# Patient Record
Sex: MALE | Race: WHITE | NOT HISPANIC OR LATINO | URBAN - METROPOLITAN AREA
[De-identification: names, ages, dates, MRNs, and addresses within clinical notes are randomized per-mention and may not be internally consistent; named-entity substitution may affect disease eponyms.]

---

## 2017-03-30 ENCOUNTER — COMMUNICATION - HEALTHEAST (OUTPATIENT)
Dept: FAMILY MEDICINE | Facility: CLINIC | Age: 42
End: 2017-03-30

## 2017-03-30 DIAGNOSIS — E03.9 HYPOTHYROIDISM: ICD-10-CM

## 2017-03-31 ENCOUNTER — COMMUNICATION - HEALTHEAST (OUTPATIENT)
Dept: FAMILY MEDICINE | Facility: CLINIC | Age: 42
End: 2017-03-31

## 2017-04-03 ENCOUNTER — AMBULATORY - HEALTHEAST (OUTPATIENT)
Dept: LAB | Facility: CLINIC | Age: 42
End: 2017-04-03

## 2017-04-03 DIAGNOSIS — E03.9 HYPOTHYROIDISM: ICD-10-CM

## 2017-04-04 ENCOUNTER — AMBULATORY - HEALTHEAST (OUTPATIENT)
Dept: FAMILY MEDICINE | Facility: CLINIC | Age: 42
End: 2017-04-04

## 2017-04-04 DIAGNOSIS — E03.9 ACQUIRED HYPOTHYROIDISM: ICD-10-CM

## 2017-04-05 ENCOUNTER — COMMUNICATION - HEALTHEAST (OUTPATIENT)
Dept: FAMILY MEDICINE | Facility: CLINIC | Age: 42
End: 2017-04-05

## 2017-04-14 ENCOUNTER — RECORDS - HEALTHEAST (OUTPATIENT)
Dept: ADMINISTRATIVE | Facility: OTHER | Age: 42
End: 2017-04-14

## 2017-10-03 ENCOUNTER — COMMUNICATION - HEALTHEAST (OUTPATIENT)
Dept: FAMILY MEDICINE | Facility: CLINIC | Age: 42
End: 2017-10-03

## 2017-10-03 DIAGNOSIS — E03.9 ACQUIRED HYPOTHYROIDISM: ICD-10-CM

## 2017-12-11 ENCOUNTER — OFFICE VISIT - HEALTHEAST (OUTPATIENT)
Dept: FAMILY MEDICINE | Facility: CLINIC | Age: 42
End: 2017-12-11

## 2017-12-11 DIAGNOSIS — E03.9 ACQUIRED HYPOTHYROIDISM: ICD-10-CM

## 2017-12-11 DIAGNOSIS — L80 VITILIGO: ICD-10-CM

## 2017-12-11 DIAGNOSIS — E78.00 HYPERCHOLESTEREMIA: ICD-10-CM

## 2017-12-11 DIAGNOSIS — R07.9 CHEST PAIN, UNSPECIFIED TYPE: ICD-10-CM

## 2017-12-11 DIAGNOSIS — Z13.1 SCREENING FOR DIABETES MELLITUS: ICD-10-CM

## 2017-12-11 NOTE — ASSESSMENT & PLAN NOTE
Family history of heart disease in his father.  Annual cholesterol check.  He will return for fasting labs later this week.

## 2017-12-11 NOTE — ASSESSMENT & PLAN NOTE
The patient has been taking Synthroid as prescribed.  He has bradycardia and weight gain on exam today.  -Check TSH and adjust thyroid replacement dose as needed.

## 2017-12-28 ENCOUNTER — AMBULATORY - HEALTHEAST (OUTPATIENT)
Dept: LAB | Facility: CLINIC | Age: 42
End: 2017-12-28

## 2017-12-28 DIAGNOSIS — K51.00 ULCERATIVE CHRONIC PANCOLITIS WITHOUT COMPLICATIONS (H): ICD-10-CM

## 2017-12-28 DIAGNOSIS — E03.9 ACQUIRED HYPOTHYROIDISM: ICD-10-CM

## 2017-12-28 DIAGNOSIS — E78.00 HYPERCHOLESTEREMIA: ICD-10-CM

## 2017-12-28 DIAGNOSIS — Z13.1 SCREENING FOR DIABETES MELLITUS: ICD-10-CM

## 2017-12-28 LAB
CHOLEST SERPL-MCNC: 261 MG/DL
FASTING STATUS PATIENT QL REPORTED: YES
HDLC SERPL-MCNC: 57 MG/DL
LDLC SERPL CALC-MCNC: 185 MG/DL
TRIGL SERPL-MCNC: 94 MG/DL

## 2017-12-31 ENCOUNTER — COMMUNICATION - HEALTHEAST (OUTPATIENT)
Dept: FAMILY MEDICINE | Facility: CLINIC | Age: 42
End: 2017-12-31

## 2017-12-31 ENCOUNTER — AMBULATORY - HEALTHEAST (OUTPATIENT)
Dept: FAMILY MEDICINE | Facility: CLINIC | Age: 42
End: 2017-12-31

## 2017-12-31 DIAGNOSIS — E03.9 ACQUIRED HYPOTHYROIDISM: ICD-10-CM

## 2018-01-03 ENCOUNTER — COMMUNICATION - HEALTHEAST (OUTPATIENT)
Dept: FAMILY MEDICINE | Facility: CLINIC | Age: 43
End: 2018-01-03

## 2018-01-08 ENCOUNTER — RECORDS - HEALTHEAST (OUTPATIENT)
Dept: ADMINISTRATIVE | Facility: OTHER | Age: 43
End: 2018-01-08

## 2018-02-02 ENCOUNTER — RECORDS - HEALTHEAST (OUTPATIENT)
Dept: ADMINISTRATIVE | Facility: OTHER | Age: 43
End: 2018-02-02

## 2018-03-20 ENCOUNTER — RECORDS - HEALTHEAST (OUTPATIENT)
Dept: ADMINISTRATIVE | Facility: OTHER | Age: 43
End: 2018-03-20

## 2018-03-22 ENCOUNTER — AMBULATORY - HEALTHEAST (OUTPATIENT)
Dept: LAB | Facility: CLINIC | Age: 43
End: 2018-03-22

## 2018-03-22 DIAGNOSIS — E03.9 ACQUIRED HYPOTHYROIDISM: ICD-10-CM

## 2018-03-22 DIAGNOSIS — K51.00 ULCERATIVE CHRONIC PANCOLITIS WITHOUT COMPLICATIONS (H): ICD-10-CM

## 2018-03-22 LAB
BUN SERPL-MCNC: 22 MG/DL (ref 8–22)
CREAT SERPL-MCNC: 1.26 MG/DL (ref 0.7–1.3)
GFR SERPL CREATININE-BSD FRML MDRD: >60 ML/MIN/1.73M2
TSH SERPL DL<=0.005 MIU/L-ACNC: 3.49 UIU/ML (ref 0.3–5)

## 2018-03-23 ENCOUNTER — AMBULATORY - HEALTHEAST (OUTPATIENT)
Dept: FAMILY MEDICINE | Facility: CLINIC | Age: 43
End: 2018-03-23

## 2018-03-23 ENCOUNTER — RECORDS - HEALTHEAST (OUTPATIENT)
Dept: ADMINISTRATIVE | Facility: OTHER | Age: 43
End: 2018-03-23

## 2018-03-23 DIAGNOSIS — E03.9 ACQUIRED HYPOTHYROIDISM: ICD-10-CM

## 2018-04-24 ENCOUNTER — RECORDS - HEALTHEAST (OUTPATIENT)
Dept: ADMINISTRATIVE | Facility: OTHER | Age: 43
End: 2018-04-24

## 2018-05-01 ENCOUNTER — RECORDS - HEALTHEAST (OUTPATIENT)
Dept: ADMINISTRATIVE | Facility: OTHER | Age: 43
End: 2018-05-01

## 2018-06-13 ENCOUNTER — RECORDS - HEALTHEAST (OUTPATIENT)
Dept: ADMINISTRATIVE | Facility: OTHER | Age: 43
End: 2018-06-13

## 2018-06-18 ENCOUNTER — COMMUNICATION - HEALTHEAST (OUTPATIENT)
Dept: FAMILY MEDICINE | Facility: CLINIC | Age: 43
End: 2018-06-18

## 2018-07-06 ENCOUNTER — COMMUNICATION - HEALTHEAST (OUTPATIENT)
Dept: FAMILY MEDICINE | Facility: CLINIC | Age: 43
End: 2018-07-06

## 2018-07-16 ENCOUNTER — COMMUNICATION - HEALTHEAST (OUTPATIENT)
Dept: FAMILY MEDICINE | Facility: CLINIC | Age: 43
End: 2018-07-16

## 2018-07-19 ENCOUNTER — COMMUNICATION - HEALTHEAST (OUTPATIENT)
Dept: FAMILY MEDICINE | Facility: CLINIC | Age: 43
End: 2018-07-19

## 2018-07-19 ENCOUNTER — AMBULATORY - HEALTHEAST (OUTPATIENT)
Dept: LAB | Facility: CLINIC | Age: 43
End: 2018-07-19

## 2018-07-19 DIAGNOSIS — K51.00 ULCERATIVE CHRONIC PANCOLITIS WITHOUT COMPLICATIONS (H): ICD-10-CM

## 2018-07-19 DIAGNOSIS — E03.9 ACQUIRED HYPOTHYROIDISM: ICD-10-CM

## 2018-07-19 LAB
BUN SERPL-MCNC: 19 MG/DL (ref 8–22)
CREAT SERPL-MCNC: 1.22 MG/DL (ref 0.7–1.3)
GFR SERPL CREATININE-BSD FRML MDRD: >60 ML/MIN/1.73M2

## 2018-07-30 ENCOUNTER — COMMUNICATION - HEALTHEAST (OUTPATIENT)
Dept: FAMILY MEDICINE | Facility: CLINIC | Age: 43
End: 2018-07-30

## 2018-07-30 DIAGNOSIS — E03.9 HYPOTHYROIDISM: ICD-10-CM

## 2018-07-31 ENCOUNTER — COMMUNICATION - HEALTHEAST (OUTPATIENT)
Dept: FAMILY MEDICINE | Facility: CLINIC | Age: 43
End: 2018-07-31

## 2018-08-02 ENCOUNTER — AMBULATORY - HEALTHEAST (OUTPATIENT)
Dept: LAB | Facility: CLINIC | Age: 43
End: 2018-08-02

## 2018-08-02 DIAGNOSIS — E03.9 HYPOTHYROIDISM: ICD-10-CM

## 2018-08-02 DIAGNOSIS — E03.9 ACQUIRED HYPOTHYROIDISM: ICD-10-CM

## 2018-08-02 LAB
CHOLEST SERPL-MCNC: 234 MG/DL
FASTING STATUS PATIENT QL REPORTED: YES
HDLC SERPL-MCNC: 57 MG/DL
LDLC SERPL CALC-MCNC: 156 MG/DL
TRIGL SERPL-MCNC: 103 MG/DL
TSH SERPL DL<=0.005 MIU/L-ACNC: 18.08 UIU/ML (ref 0.3–5)

## 2018-08-03 ENCOUNTER — AMBULATORY - HEALTHEAST (OUTPATIENT)
Dept: FAMILY MEDICINE | Facility: CLINIC | Age: 43
End: 2018-08-03

## 2018-08-03 ENCOUNTER — COMMUNICATION - HEALTHEAST (OUTPATIENT)
Dept: FAMILY MEDICINE | Facility: CLINIC | Age: 43
End: 2018-08-03

## 2018-08-03 DIAGNOSIS — E03.9 ACQUIRED HYPOTHYROIDISM: ICD-10-CM

## 2018-09-05 ENCOUNTER — COMMUNICATION - HEALTHEAST (OUTPATIENT)
Dept: FAMILY MEDICINE | Facility: CLINIC | Age: 43
End: 2018-09-05

## 2018-09-28 ASSESSMENT — MIFFLIN-ST. JEOR: SCORE: 1832

## 2018-09-29 ENCOUNTER — SURGERY - HEALTHEAST (OUTPATIENT)
Dept: SURGERY | Facility: CLINIC | Age: 43
End: 2018-09-29

## 2018-09-29 ENCOUNTER — ANESTHESIA - HEALTHEAST (OUTPATIENT)
Dept: SURGERY | Facility: CLINIC | Age: 43
End: 2018-09-29

## 2018-09-29 ASSESSMENT — MIFFLIN-ST. JEOR: SCORE: 1865.74

## 2018-10-02 ENCOUNTER — COMMUNICATION - HEALTHEAST (OUTPATIENT)
Dept: CARE COORDINATION | Facility: CLINIC | Age: 43
End: 2018-10-02

## 2018-10-04 ENCOUNTER — COMMUNICATION - HEALTHEAST (OUTPATIENT)
Dept: FAMILY MEDICINE | Facility: CLINIC | Age: 43
End: 2018-10-04

## 2018-10-04 ENCOUNTER — OFFICE VISIT - HEALTHEAST (OUTPATIENT)
Dept: FAMILY MEDICINE | Facility: CLINIC | Age: 43
End: 2018-10-04

## 2018-10-04 DIAGNOSIS — Z09 HOSPITAL DISCHARGE FOLLOW-UP: ICD-10-CM

## 2018-10-04 DIAGNOSIS — Z90.49 S/P APPENDECTOMY: ICD-10-CM

## 2018-10-04 DIAGNOSIS — E03.9 ACQUIRED HYPOTHYROIDISM: ICD-10-CM

## 2018-10-04 NOTE — ASSESSMENT & PLAN NOTE
Question absorption.  The patient will be taking his Synthroid at approximately 5 AM by setting an alarm.  We will recheck in 6-8 weeks.  If his TSH has not improved, will adjust his levothyroxine dose and refer to endocrinology.

## 2018-11-19 ENCOUNTER — COMMUNICATION - HEALTHEAST (OUTPATIENT)
Dept: FAMILY MEDICINE | Facility: CLINIC | Age: 43
End: 2018-11-19

## 2018-11-27 ENCOUNTER — COMMUNICATION - HEALTHEAST (OUTPATIENT)
Dept: FAMILY MEDICINE | Facility: CLINIC | Age: 43
End: 2018-11-27

## 2018-11-27 DIAGNOSIS — E78.00 HYPERCHOLESTEREMIA: ICD-10-CM

## 2018-11-28 ENCOUNTER — AMBULATORY - HEALTHEAST (OUTPATIENT)
Dept: LAB | Facility: CLINIC | Age: 43
End: 2018-11-28

## 2018-11-28 DIAGNOSIS — E03.9 ACQUIRED HYPOTHYROIDISM: ICD-10-CM

## 2018-11-28 DIAGNOSIS — E78.00 HYPERCHOLESTEREMIA: ICD-10-CM

## 2018-11-28 LAB — TSH SERPL DL<=0.005 MIU/L-ACNC: 1.81 UIU/ML (ref 0.3–5)

## 2018-11-29 ENCOUNTER — COMMUNICATION - HEALTHEAST (OUTPATIENT)
Dept: FAMILY MEDICINE | Facility: CLINIC | Age: 43
End: 2018-11-29

## 2018-11-29 LAB
CHOLEST SERPL-MCNC: 247 MG/DL
FASTING STATUS PATIENT QL REPORTED: YES
HDLC SERPL-MCNC: 59 MG/DL
LDLC SERPL CALC-MCNC: 169 MG/DL
TRIGL SERPL-MCNC: 97 MG/DL

## 2018-12-31 ENCOUNTER — OFFICE VISIT - HEALTHEAST (OUTPATIENT)
Dept: FAMILY MEDICINE | Facility: CLINIC | Age: 43
End: 2018-12-31

## 2018-12-31 ENCOUNTER — RECORDS - HEALTHEAST (OUTPATIENT)
Dept: GENERAL RADIOLOGY | Facility: CLINIC | Age: 43
End: 2018-12-31

## 2018-12-31 DIAGNOSIS — M25.512 ACUTE PAIN OF LEFT SHOULDER: ICD-10-CM

## 2018-12-31 DIAGNOSIS — M25.512 PAIN IN LEFT SHOULDER: ICD-10-CM

## 2018-12-31 ASSESSMENT — MIFFLIN-ST. JEOR: SCORE: 1860.35

## 2018-12-31 NOTE — ASSESSMENT & PLAN NOTE
This patient has a muscular build and works out on a regular basis.  He has mild pain with certain movements (Lemons) which suggest rotator cuff injury/inflammation versus AC joint impingement.  Recommending scheduled anti-inflammatories and physical therapy.  We did discuss the utility of a MRI but given abnormality of shoulder x-ray will defer for now.  Consider orthopedic referral as well.

## 2019-01-14 ENCOUNTER — OFFICE VISIT - HEALTHEAST (OUTPATIENT)
Dept: PHYSICAL THERAPY | Facility: REHABILITATION | Age: 44
End: 2019-01-14

## 2019-01-14 DIAGNOSIS — M25.612 DECREASED RANGE OF MOTION OF LEFT SHOULDER: ICD-10-CM

## 2019-01-14 DIAGNOSIS — M25.512 ACUTE PAIN OF LEFT SHOULDER: ICD-10-CM

## 2019-01-14 DIAGNOSIS — M62.81 GENERALIZED MUSCLE WEAKNESS: ICD-10-CM

## 2019-01-21 ENCOUNTER — COMMUNICATION - HEALTHEAST (OUTPATIENT)
Dept: PHYSICAL THERAPY | Facility: REHABILITATION | Age: 44
End: 2019-01-21

## 2019-01-23 ENCOUNTER — COMMUNICATION - HEALTHEAST (OUTPATIENT)
Dept: FAMILY MEDICINE | Facility: CLINIC | Age: 44
End: 2019-01-23

## 2019-01-23 DIAGNOSIS — E03.9 ACQUIRED HYPOTHYROIDISM: ICD-10-CM

## 2019-02-04 ENCOUNTER — COMMUNICATION - HEALTHEAST (OUTPATIENT)
Dept: PHYSICAL THERAPY | Facility: REHABILITATION | Age: 44
End: 2019-02-04

## 2019-02-18 ENCOUNTER — OFFICE VISIT - HEALTHEAST (OUTPATIENT)
Dept: PHYSICAL THERAPY | Facility: REHABILITATION | Age: 44
End: 2019-02-18

## 2019-02-18 DIAGNOSIS — M25.512 ACUTE PAIN OF LEFT SHOULDER: ICD-10-CM

## 2019-02-18 DIAGNOSIS — M62.81 GENERALIZED MUSCLE WEAKNESS: ICD-10-CM

## 2019-02-18 DIAGNOSIS — M25.612 DECREASED RANGE OF MOTION OF LEFT SHOULDER: ICD-10-CM

## 2019-07-18 ENCOUNTER — RECORDS - HEALTHEAST (OUTPATIENT)
Dept: ADMINISTRATIVE | Facility: OTHER | Age: 44
End: 2019-07-18

## 2019-10-09 ENCOUNTER — COMMUNICATION - HEALTHEAST (OUTPATIENT)
Dept: FAMILY MEDICINE | Facility: CLINIC | Age: 44
End: 2019-10-09

## 2019-10-09 DIAGNOSIS — Z72.0 TOBACCO ABUSE: ICD-10-CM

## 2019-10-11 ENCOUNTER — AMBULATORY - HEALTHEAST (OUTPATIENT)
Dept: FAMILY MEDICINE | Facility: CLINIC | Age: 44
End: 2019-10-11

## 2019-10-11 DIAGNOSIS — Z13.1 SCREENING FOR DIABETES MELLITUS: ICD-10-CM

## 2019-10-11 DIAGNOSIS — Z82.49 FAMILY HISTORY OF EARLY CAD: ICD-10-CM

## 2019-10-11 DIAGNOSIS — E78.00 HYPERCHOLESTEREMIA: ICD-10-CM

## 2019-10-11 DIAGNOSIS — E03.9 ACQUIRED HYPOTHYROIDISM: ICD-10-CM

## 2019-10-14 ENCOUNTER — AMBULATORY - HEALTHEAST (OUTPATIENT)
Dept: LAB | Facility: CLINIC | Age: 44
End: 2019-10-14

## 2019-10-14 DIAGNOSIS — Z82.49 FAMILY HISTORY OF EARLY CAD: ICD-10-CM

## 2019-10-14 DIAGNOSIS — E03.9 ACQUIRED HYPOTHYROIDISM: ICD-10-CM

## 2019-10-14 DIAGNOSIS — E78.00 HYPERCHOLESTEREMIA: ICD-10-CM

## 2019-10-14 DIAGNOSIS — Z13.1 SCREENING FOR DIABETES MELLITUS: ICD-10-CM

## 2019-10-14 LAB
ALT SERPL W P-5'-P-CCNC: 24 U/L (ref 0–45)
ANION GAP SERPL CALCULATED.3IONS-SCNC: 7 MMOL/L (ref 5–18)
BUN SERPL-MCNC: 19 MG/DL (ref 8–22)
CALCIUM SERPL-MCNC: 9.8 MG/DL (ref 8.5–10.5)
CHLORIDE BLD-SCNC: 105 MMOL/L (ref 98–107)
CHOLEST SERPL-MCNC: 246 MG/DL
CO2 SERPL-SCNC: 28 MMOL/L (ref 22–31)
CREAT SERPL-MCNC: 1.09 MG/DL (ref 0.7–1.3)
FASTING STATUS PATIENT QL REPORTED: YES
GFR SERPL CREATININE-BSD FRML MDRD: >60 ML/MIN/1.73M2
GLUCOSE BLD-MCNC: 97 MG/DL (ref 70–125)
HDLC SERPL-MCNC: 70 MG/DL
LDLC SERPL CALC-MCNC: 165 MG/DL
POTASSIUM BLD-SCNC: 4.7 MMOL/L (ref 3.5–5)
SODIUM SERPL-SCNC: 140 MMOL/L (ref 136–145)
TRIGL SERPL-MCNC: 56 MG/DL
TSH SERPL DL<=0.005 MIU/L-ACNC: 7.11 UIU/ML (ref 0.3–5)

## 2019-10-30 ENCOUNTER — RECORDS - HEALTHEAST (OUTPATIENT)
Dept: ADMINISTRATIVE | Facility: OTHER | Age: 44
End: 2019-10-30

## 2019-11-05 ENCOUNTER — COMMUNICATION - HEALTHEAST (OUTPATIENT)
Dept: FAMILY MEDICINE | Facility: CLINIC | Age: 44
End: 2019-11-05

## 2019-11-05 DIAGNOSIS — L80 VITILIGO: ICD-10-CM

## 2019-11-05 RX ORDER — CLOBETASOL PROPIONATE 0.5 MG/G
OINTMENT TOPICAL 2 TIMES DAILY
Qty: 45 G | Refills: 1 | Status: SHIPPED | OUTPATIENT
Start: 2019-11-05 | End: 2023-08-03

## 2019-11-25 ENCOUNTER — OFFICE VISIT - HEALTHEAST (OUTPATIENT)
Dept: FAMILY MEDICINE | Facility: CLINIC | Age: 44
End: 2019-11-25

## 2019-11-25 ENCOUNTER — COMMUNICATION - HEALTHEAST (OUTPATIENT)
Dept: FAMILY MEDICINE | Facility: CLINIC | Age: 44
End: 2019-11-25

## 2019-11-25 DIAGNOSIS — E03.9 ACQUIRED HYPOTHYROIDISM: ICD-10-CM

## 2019-11-25 DIAGNOSIS — Z00.00 ROUTINE GENERAL MEDICAL EXAMINATION AT A HEALTH CARE FACILITY: ICD-10-CM

## 2019-11-25 DIAGNOSIS — Z72.0 TOBACCO ABUSE: ICD-10-CM

## 2019-11-25 DIAGNOSIS — E78.00 HYPERCHOLESTEREMIA: ICD-10-CM

## 2019-11-25 DIAGNOSIS — Z82.49 FAMILY HISTORY OF EARLY CAD: ICD-10-CM

## 2019-11-25 LAB — TSH SERPL DL<=0.005 MIU/L-ACNC: 0.05 UIU/ML (ref 0.3–5)

## 2019-11-25 RX ORDER — MESALAMINE 1.2 G/1
TABLET, DELAYED RELEASE ORAL
Status: SHIPPED | COMMUNITY
Start: 2019-11-25

## 2019-11-25 ASSESSMENT — MIFFLIN-ST. JEOR: SCORE: 1870.56

## 2019-11-25 NOTE — ASSESSMENT & PLAN NOTE
The patient and I discussed the most recent TSH measurement.  This was drawn approximately 1 week after he completed a month-long course of steroids for an ulcerative colitis flare.  We will plan to recheck his TSH today before adjusting his levothyroxine.

## 2019-11-25 NOTE — ASSESSMENT & PLAN NOTE
Patient presents for annual exam.  Fasting lab work was completed in October.  This was reviewed with the patient today.  We will repeat a TSH today as his previous may have been erroneous as result of Synthroid administration and treatment for ulcerative colitis.  The patient is up-to-date with preventative health recommendations.  He has a strong family history for dementia (Alzheimer's?)  As well as heart disease.  We discussed potential benefits of testing for ApoE genotype but deferred.  I would not order this test previously and would not be able to accurately interpret it.

## 2019-11-25 NOTE — ASSESSMENT & PLAN NOTE
Family history of early coronary artery disease in father with initial MI (or at least coronary event) in his 40s.  The patient's 10-year car vascular risk is 2%.  His cardiac calcium CT score in 2015 was low.  - Statins not indicated at this time based on the above work-up.  -Check lipids in 1 year.  - Continue to remain physically active  -Consider repeatof cardiac calcium CT score in 2020 or 2021.

## 2019-11-26 RX ORDER — VARENICLINE TARTRATE 1 MG/1
TABLET, FILM COATED ORAL
Qty: 56 TABLET | Refills: 2 | Status: SHIPPED | OUTPATIENT
Start: 2019-11-26 | End: 2021-11-29

## 2020-01-30 ENCOUNTER — COMMUNICATION - HEALTHEAST (OUTPATIENT)
Dept: FAMILY MEDICINE | Facility: CLINIC | Age: 45
End: 2020-01-30

## 2020-01-30 DIAGNOSIS — E03.9 ACQUIRED HYPOTHYROIDISM: ICD-10-CM

## 2020-02-13 ENCOUNTER — RECORDS - HEALTHEAST (OUTPATIENT)
Dept: ADMINISTRATIVE | Facility: OTHER | Age: 45
End: 2020-02-13

## 2020-02-21 ENCOUNTER — COMMUNICATION - HEALTHEAST (OUTPATIENT)
Dept: FAMILY MEDICINE | Facility: CLINIC | Age: 45
End: 2020-02-21

## 2020-02-21 DIAGNOSIS — E03.9 HYPOTHYROIDISM: ICD-10-CM

## 2020-04-28 ENCOUNTER — COMMUNICATION - HEALTHEAST (OUTPATIENT)
Dept: FAMILY MEDICINE | Facility: CLINIC | Age: 45
End: 2020-04-28

## 2020-04-28 DIAGNOSIS — E03.9 HYPOTHYROIDISM: ICD-10-CM

## 2020-06-03 ENCOUNTER — RECORDS - HEALTHEAST (OUTPATIENT)
Dept: ADMINISTRATIVE | Facility: OTHER | Age: 45
End: 2020-06-03

## 2020-07-01 ENCOUNTER — RECORDS - HEALTHEAST (OUTPATIENT)
Dept: ADMINISTRATIVE | Facility: OTHER | Age: 45
End: 2020-07-01

## 2020-07-15 ENCOUNTER — RECORDS - HEALTHEAST (OUTPATIENT)
Dept: ADMINISTRATIVE | Facility: OTHER | Age: 45
End: 2020-07-15

## 2020-07-16 ENCOUNTER — COMMUNICATION - HEALTHEAST (OUTPATIENT)
Dept: FAMILY MEDICINE | Facility: CLINIC | Age: 45
End: 2020-07-16

## 2020-07-22 ENCOUNTER — AMBULATORY - HEALTHEAST (OUTPATIENT)
Dept: LAB | Facility: CLINIC | Age: 45
End: 2020-07-22

## 2020-07-22 DIAGNOSIS — K51.00 ULCERATIVE (CHRONIC) ENTEROCOLITIS (H): ICD-10-CM

## 2020-07-22 DIAGNOSIS — E03.9 HYPOTHYROIDISM: ICD-10-CM

## 2020-07-22 LAB
BUN SERPL-MCNC: 16 MG/DL (ref 8–22)
CREAT SERPL-MCNC: 0.97 MG/DL (ref 0.7–1.3)
GFR SERPL CREATININE-BSD FRML MDRD: >60 ML/MIN/1.73M2
TSH SERPL DL<=0.005 MIU/L-ACNC: 2.18 UIU/ML (ref 0.3–5)

## 2020-07-23 ENCOUNTER — COMMUNICATION - HEALTHEAST (OUTPATIENT)
Dept: LAB | Facility: CLINIC | Age: 45
End: 2020-07-23

## 2020-07-23 LAB — 25(OH)D3 SERPL-MCNC: 111 NG/ML (ref 30–80)

## 2020-07-24 ENCOUNTER — AMBULATORY - HEALTHEAST (OUTPATIENT)
Dept: FAMILY MEDICINE | Facility: CLINIC | Age: 45
End: 2020-07-24

## 2020-10-19 ENCOUNTER — COMMUNICATION - HEALTHEAST (OUTPATIENT)
Dept: FAMILY MEDICINE | Facility: CLINIC | Age: 45
End: 2020-10-19

## 2020-10-19 DIAGNOSIS — E03.9 ACQUIRED HYPOTHYROIDISM: ICD-10-CM

## 2020-10-19 RX ORDER — LEVOTHYROXINE SODIUM 200 UG/1
200 TABLET ORAL DAILY
Qty: 90 TABLET | Refills: 3 | Status: SHIPPED | OUTPATIENT
Start: 2020-10-19 | End: 2022-01-07

## 2021-04-08 ENCOUNTER — RECORDS - HEALTHEAST (OUTPATIENT)
Dept: ADMINISTRATIVE | Facility: OTHER | Age: 46
End: 2021-04-08

## 2021-04-30 ENCOUNTER — OFFICE VISIT - HEALTHEAST (OUTPATIENT)
Dept: FAMILY MEDICINE | Facility: CLINIC | Age: 46
End: 2021-04-30

## 2021-04-30 DIAGNOSIS — F43.22 ADJUSTMENT DISORDER WITH ANXIOUS MOOD: ICD-10-CM

## 2021-04-30 DIAGNOSIS — Z23 NEED FOR VACCINATION: ICD-10-CM

## 2021-04-30 DIAGNOSIS — M25.512 CHRONIC LEFT SHOULDER PAIN: ICD-10-CM

## 2021-04-30 DIAGNOSIS — Z13.1 SCREENING FOR DIABETES MELLITUS: ICD-10-CM

## 2021-04-30 DIAGNOSIS — G89.29 CHRONIC LEFT SHOULDER PAIN: ICD-10-CM

## 2021-04-30 DIAGNOSIS — T45.2X1D POISONING BY VITAMIN D, ACCIDENTAL OR UNINTENTIONAL, SUBSEQUENT ENCOUNTER: ICD-10-CM

## 2021-04-30 DIAGNOSIS — K51.90 CHRONIC ULCERATIVE COLITIS WITHOUT COMPLICATION, UNSPECIFIED LOCATION (H): ICD-10-CM

## 2021-04-30 DIAGNOSIS — E78.00 HYPERCHOLESTEREMIA: ICD-10-CM

## 2021-04-30 DIAGNOSIS — Z00.00 ROUTINE GENERAL MEDICAL EXAMINATION AT A HEALTH CARE FACILITY: ICD-10-CM

## 2021-04-30 DIAGNOSIS — E03.9 ACQUIRED HYPOTHYROIDISM: ICD-10-CM

## 2021-04-30 DIAGNOSIS — G47.00 INSOMNIA, UNSPECIFIED TYPE: ICD-10-CM

## 2021-04-30 RX ORDER — TRAZODONE HYDROCHLORIDE 50 MG/1
50-100 TABLET, FILM COATED ORAL AT BEDTIME
Qty: 60 TABLET | Refills: 2 | Status: SHIPPED | OUTPATIENT
Start: 2021-04-30 | End: 2021-11-29

## 2021-04-30 ASSESSMENT — MIFFLIN-ST. JEOR: SCORE: 1825.2

## 2021-04-30 NOTE — ASSESSMENT & PLAN NOTE
We discussed sleep hygiene, cognitive behavioral therapy for insomnia.  He is struggling with maintaining sleep.  We reviewed the risks and benefits of a trial of trazodone.  This medication was sent to the pharmacy.  He has been on this previously.

## 2021-04-30 NOTE — ASSESSMENT & PLAN NOTE
Overall, the patient says these been doing well.  He has been working to improve his nutrition and in general avoids processed/refined/sugar filled carbohydrates.  He is frustrated that his weight has stabilized.  He is concerned about central adiposity.  Inflammatory bowel disease (on mesalamine) has been stable.  Fasting lab work will be done in the near future.  Given his concerns about multiple autoimmune disorders as well as struggling with weight he is interested in a functional medicine consultation with Dr. Lamb.  He wasscheduled for this appointment next week.  Tdap ordered.  Patient declines Covid vaccination scheduling assistance.

## 2021-05-06 ENCOUNTER — OFFICE VISIT - HEALTHEAST (OUTPATIENT)
Dept: FAMILY MEDICINE | Facility: CLINIC | Age: 46
End: 2021-05-06

## 2021-05-06 DIAGNOSIS — E03.9 HYPOTHYROIDISM, UNSPECIFIED TYPE: ICD-10-CM

## 2021-05-06 DIAGNOSIS — K51.90 CHRONIC ULCERATIVE COLITIS WITHOUT COMPLICATION, UNSPECIFIED LOCATION (H): ICD-10-CM

## 2021-05-06 ASSESSMENT — MIFFLIN-ST. JEOR: SCORE: 1825.2

## 2021-05-18 ENCOUNTER — AMBULATORY - HEALTHEAST (OUTPATIENT)
Dept: LAB | Facility: CLINIC | Age: 46
End: 2021-05-18

## 2021-05-18 DIAGNOSIS — Z13.1 SCREENING FOR DIABETES MELLITUS: ICD-10-CM

## 2021-05-18 DIAGNOSIS — K51.90 CHRONIC ULCERATIVE COLITIS WITHOUT COMPLICATION, UNSPECIFIED LOCATION (H): ICD-10-CM

## 2021-05-18 DIAGNOSIS — E03.9 HYPOTHYROIDISM, UNSPECIFIED TYPE: ICD-10-CM

## 2021-05-18 DIAGNOSIS — T45.2X1D POISONING BY VITAMIN D, ACCIDENTAL OR UNINTENTIONAL, SUBSEQUENT ENCOUNTER: ICD-10-CM

## 2021-05-18 DIAGNOSIS — E78.00 HYPERCHOLESTEREMIA: ICD-10-CM

## 2021-05-18 LAB
ALT SERPL W P-5'-P-CCNC: 38 U/L (ref 0–45)
ANION GAP SERPL CALCULATED.3IONS-SCNC: 11 MMOL/L (ref 5–18)
BUN SERPL-MCNC: 13 MG/DL (ref 8–22)
CALCIUM SERPL-MCNC: 9.7 MG/DL (ref 8.5–10.5)
CHLORIDE BLD-SCNC: 103 MMOL/L (ref 98–107)
CHOLEST SERPL-MCNC: 252 MG/DL
CO2 SERPL-SCNC: 25 MMOL/L (ref 22–31)
CREAT SERPL-MCNC: 1.07 MG/DL (ref 0.7–1.3)
CRP SERPL HS-MCNC: 0.9 MG/L (ref 0–3)
FASTING STATUS PATIENT QL REPORTED: YES
GFR SERPL CREATININE-BSD FRML MDRD: >60 ML/MIN/1.73M2
GLUCOSE BLD-MCNC: 96 MG/DL (ref 70–125)
HDLC SERPL-MCNC: 56 MG/DL
LDLC SERPL CALC-MCNC: 170 MG/DL
POTASSIUM BLD-SCNC: 4.6 MMOL/L (ref 3.5–5)
SODIUM SERPL-SCNC: 139 MMOL/L (ref 136–145)
T3FREE SERPL-MCNC: 2.7 PG/ML (ref 1.9–3.9)
T4 FREE SERPL-MCNC: 1.4 NG/DL (ref 0.7–1.8)
TRIGL SERPL-MCNC: 131 MG/DL
TSH SERPL DL<=0.005 MIU/L-ACNC: 0.53 UIU/ML (ref 0.3–5)

## 2021-05-19 LAB
25(OH)D3 SERPL-MCNC: 49.1 NG/ML (ref 30–80)
25(OH)D3 SERPL-MCNC: 49.1 NG/ML (ref 30–80)

## 2021-05-20 ENCOUNTER — COMMUNICATION - HEALTHEAST (OUTPATIENT)
Dept: FAMILY MEDICINE | Facility: CLINIC | Age: 46
End: 2021-05-20

## 2021-05-20 DIAGNOSIS — G89.29 CHRONIC LEFT SHOULDER PAIN: ICD-10-CM

## 2021-05-20 DIAGNOSIS — M25.512 CHRONIC LEFT SHOULDER PAIN: ICD-10-CM

## 2021-05-21 LAB
T3REVERSE SERPL-MCNC: 19.1 NG/DL (ref 9–27)
ZINC SERPL-MCNC: 86.2 UG/DL (ref 60–120)

## 2021-05-22 LAB
MAGNESIUM,RBC - HISTORICAL: 4.3 MG/DL (ref 3.5–7.1)
SPECIMEN STATUS: NORMAL

## 2021-05-24 ENCOUNTER — COMMUNICATION - HEALTHEAST (OUTPATIENT)
Dept: FAMILY MEDICINE | Facility: CLINIC | Age: 46
End: 2021-05-24

## 2021-05-24 DIAGNOSIS — G47.00 INSOMNIA, UNSPECIFIED TYPE: ICD-10-CM

## 2021-05-26 ENCOUNTER — RECORDS - HEALTHEAST (OUTPATIENT)
Dept: ADMINISTRATIVE | Facility: CLINIC | Age: 46
End: 2021-05-26

## 2021-05-27 VITALS
HEIGHT: 70 IN | DIASTOLIC BLOOD PRESSURE: 70 MMHG | BODY MASS INDEX: 29.63 KG/M2 | SYSTOLIC BLOOD PRESSURE: 120 MMHG | HEART RATE: 63 BPM | OXYGEN SATURATION: 99 % | WEIGHT: 207 LBS

## 2021-05-29 ENCOUNTER — RECORDS - HEALTHEAST (OUTPATIENT)
Dept: ADMINISTRATIVE | Facility: CLINIC | Age: 46
End: 2021-05-29

## 2021-05-30 ENCOUNTER — RECORDS - HEALTHEAST (OUTPATIENT)
Dept: ADMINISTRATIVE | Facility: CLINIC | Age: 46
End: 2021-05-30

## 2021-05-31 VITALS — WEIGHT: 226 LBS | BODY MASS INDEX: 30.65 KG/M2

## 2021-06-02 VITALS — HEIGHT: 71 IN | WEIGHT: 213 LBS | BODY MASS INDEX: 29.82 KG/M2

## 2021-06-02 VITALS — WEIGHT: 210 LBS | BODY MASS INDEX: 29.29 KG/M2

## 2021-06-02 VITALS — HEIGHT: 71 IN | BODY MASS INDEX: 29.74 KG/M2 | WEIGHT: 212.44 LBS

## 2021-06-02 NOTE — TELEPHONE ENCOUNTER
RN cannot approve Refill Request    RN can NOT refill this medication Protocol failed and NO refill given. Last office visit: 12/31/2018 Rad Garcia MD Last Physical: Visit date not found Last MTM visit: Visit date not found Last visit same specialty: 12/31/2018 Rad Garcia MD.  Next visit within 3 mo: Visit date not found  Next physical within 3 mo: Visit date not found      Marcie Shukla, [facility] Triage/Med Refill 10/10/2019    Requested Prescriptions   Pending Prescriptions Disp Refills     CHANTIX CONTINUING MONTH BOX 1 mg tablet [Pharmacy Med Name: CHANTIX 1 MG CONT MONTH BOX] 56 tablet 2     Sig: TAKE 1 TABLET (1 MG TOTAL) BY MOUTH 2 (TWO) TIMES A DAY. TAKE WITH FULL GLASS OF WATER.       Varenicline Refill Protocol Failed - 10/9/2019 11:28 AM        Failed - Normal Serum Creatinine in past 12 months      Creatinine   Date Value Ref Range Status   09/29/2018 1.21 0.70 - 1.30 mg/dL Final             Passed - PCP or prescribing provider visit in last 12 or next 3 months.     Last office visit with prescriber/PCP: 12/31/2018 Rad Garcia MD OR same dept: 12/31/2018 Rad Garcia MD  Last physical: Visit date not found       Next visit within 3 mo: Visit date not found  Next physical within 3 mo: Visit date not found  Prescriber OR PCP: Rad Garcia MD  Last diagnosis associated with med order: There are no diagnoses linked to this encounter.   Requested Prescriptions     Pending Prescriptions Disp Refills     CHANTIX CONTINUING MONTH BOX 1 mg tablet [Pharmacy Med Name: CHANTIX 1 MG CONT MONTH BOX] 56 tablet 2     Sig: TAKE 1 TABLET (1 MG TOTAL) BY MOUTH 2 (TWO) TIMES A DAY. TAKE WITH FULL GLASS OF WATER.     May refill for 3 months if protocol passes.

## 2021-06-03 NOTE — TELEPHONE ENCOUNTER
Refill Approved    Rx renewed per Medication Renewal Policy. Medication was last renewed on 10/11/19.    Lacy Ybarra, [facility] Triage/Med Refill 11/26/2019     Requested Prescriptions   Pending Prescriptions Disp Refills     CHANTIX CONTINUING MONTH BOX 1 mg tablet [Pharmacy Med Name: CHANTIX 1 MG CONT MONTH BOX] 56 tablet 2     Sig: TAKE 1 TABLET (1 MG TOTAL) BY MOUTH 2 (TWO) TIMES A DAY. TAKE WITH FULL GLASS OF WATER.       Varenicline Refill Protocol Passed - 11/25/2019  9:32 AM        Passed - Normal Serum Creatinine in past 12 months      Creatinine   Date Value Ref Range Status   10/14/2019 1.09 0.70 - 1.30 mg/dL Final             Passed - PCP or prescribing provider visit in last 12 or next 3 months.     Last office visit with prescriber/PCP: 12/31/2018 Rad Garcia MD OR same dept: 12/31/2018 Rad Garcia MD  Last physical: Visit date not found       Next visit within 3 mo: Visit date not found  Next physical within 3 mo: 11/25/2019 Rad Garcia MD  Prescriber OR PCP: Rad Garcia MD  Last diagnosis associated with med order: 1. Tobacco abuse  - CHANTIX CONTINUING MONTH BOX 1 mg tablet [Pharmacy Med Name: CHANTIX 1 MG CONT MONTH BOX]; TAKE 1 TABLET (1 MG TOTAL) BY MOUTH 2 (TWO) TIMES A DAY. TAKE WITH FULL GLASS OF WATER.  Dispense: 56 tablet; Refill: 2     Requested Prescriptions     Pending Prescriptions Disp Refills     CHANTIX CONTINUING MONTH BOX 1 mg tablet [Pharmacy Med Name: CHANTIX 1 MG CONT MONTH BOX] 56 tablet 2     Sig: TAKE 1 TABLET (1 MG TOTAL) BY MOUTH 2 (TWO) TIMES A DAY. TAKE WITH FULL GLASS OF WATER.     May refill for 3 months if protocol passes.

## 2021-06-04 VITALS
BODY MASS INDEX: 31.07 KG/M2 | HEART RATE: 72 BPM | SYSTOLIC BLOOD PRESSURE: 122 MMHG | WEIGHT: 217 LBS | TEMPERATURE: 98.4 F | RESPIRATION RATE: 20 BRPM | HEIGHT: 70 IN | OXYGEN SATURATION: 98 % | DIASTOLIC BLOOD PRESSURE: 60 MMHG

## 2021-06-05 VITALS
SYSTOLIC BLOOD PRESSURE: 126 MMHG | RESPIRATION RATE: 14 BRPM | OXYGEN SATURATION: 95 % | HEIGHT: 70 IN | HEART RATE: 72 BPM | BODY MASS INDEX: 29.63 KG/M2 | TEMPERATURE: 98.2 F | DIASTOLIC BLOOD PRESSURE: 76 MMHG | WEIGHT: 207 LBS

## 2021-06-05 NOTE — TELEPHONE ENCOUNTER
Refill Approved    Rx renewed per Medication Renewal Policy. Medication was last renewed on 11/26/19.    Chelle Harper, [facility] Triage/Med Refill 1/30/2020     Requested Prescriptions   Pending Prescriptions Disp Refills     levothyroxine (SYNTHROID, LEVOTHROID) 200 MCG tablet [Pharmacy Med Name: LEVOTHYROXINE 200 MCG TABLET] 90 tablet 3     Sig: TAKE 1 TABLET (200 MCG TOTAL) BY MOUTH DAILY AT 6:00 AM.       Thyroid Hormones Protocol Passed - 1/30/2020  2:14 AM        Passed - Provider visit in past 12 months or next 3 months     Last office visit with prescriber/PCP: 12/31/2018 Rad Garcia MD OR same dept: Visit date not found OR same specialty: 12/31/2018 Rad Garcia MD  Last physical: 11/25/2019 Last MTM visit: Visit date not found   Next visit within 3 mo: Visit date not found  Next physical within 3 mo: Visit date not found  Prescriber OR PCP: Rad Garcia MD  Last diagnosis associated with med order: 1. Acquired hypothyroidism  - levothyroxine (SYNTHROID, LEVOTHROID) 200 MCG tablet [Pharmacy Med Name: LEVOTHYROXINE 200 MCG TABLET]; TAKE 1 TABLET (200 MCG TOTAL) BY MOUTH DAILY AT 6:00 AM.  Dispense: 90 tablet; Refill: 3    If protocol passes may refill for 12 months if within 3 months of last provider visit (or a total of 15 months).             Passed - TSH on file in past 12 months for patient age 12 & older     TSH   Date Value Ref Range Status   11/25/2019 0.05 (L) 0.30 - 5.00 uIU/mL Final

## 2021-06-06 NOTE — TELEPHONE ENCOUNTER
My chart message sent to patient to come to lab for TSH check.      Set up to authorize    Lamar Lynch LPN

## 2021-06-14 NOTE — PROGRESS NOTES
Assessment/Plan:    Problem List Items Addressed This Visit        ENT/CARD/PULM/ENDO Problems    Hypothyroidism - Primary     The patient has been taking Synthroid as prescribed.  He has bradycardia and weight gain on exam today.  -Check TSH and adjust thyroid replacement dose as needed.         Relevant Orders    Thyroid Stimulating Hormone (TSH)    Thyroid Stimulating Hormone (TSH)    Hypercholesteremia     Family history of heart disease in his father.  Annual cholesterol check.  He will return for fasting labs later this week.         Relevant Orders    Lipid Cascade       Other    Primary Vitiligo    Relevant Orders    Ambulatory referral to Dermatology    Chest pain     Mostly improved.  Comes and goes in general in correlation with his smoking which he does from time to time.  Advised to quit smoking.  Also advised to return to clinic if it becomes more exertional or worsens.  The patient and I reviewed his stress test from 2016.  Normal/negative.           Other Visit Diagnoses     Screening for diabetes mellitus        Relevant Orders    Glucose        Rad Garcia MD  _______________________________    Chief Complaint   Patient presents with     Follow-up     thyroid      Subjective: Fred Terry is a 42 y.o. year old male who returns to clinic for the following chronic complaints/concerns:     thyroid:   - doing well.   - eating more   - energy has been okay   - BMs okay.   -Exercise decreased.  -The patient's vitiligo has been intermittently symptomatic.  -Some mild left elbow pain which is been present on and off for a number of years.  -He otherwise feels well.    Review of systems is negative except for as shown in the HPI.    The following portions of the patient's history were reviewed and updated as appropriate: allergies, current medications, past medical history and problem list.    Objective:    weight is 226 lb (102.5 kg) (abnormal). His blood pressure is 126/70 and his pulse is 54  (abnormal).   General: No acute distress  Psych: Normal affect.    No results found for this or any previous visit (from the past 24 hour(s)).    Additional History from Old Records Summarized (2): no  Decision to Obtain Records (1): no  Radiology Tests Summarized or Ordered (1): no  Labs Reviewed or Ordered (1): yes  Medicine Test Summarized or Ordered (1): no  Independent Review of EKG or X-RAY(2 each): no    This note has been dictated using voice recognition software. Any grammatical or context distortions are unintentional and inherent to the software

## 2021-06-16 PROBLEM — M25.512 CHRONIC LEFT SHOULDER PAIN: Status: ACTIVE | Noted: 2018-12-31

## 2021-06-16 PROBLEM — E78.00 HYPERCHOLESTEREMIA: Status: ACTIVE | Noted: 2017-12-11

## 2021-06-16 PROBLEM — G47.00 INSOMNIA, UNSPECIFIED TYPE: Status: ACTIVE | Noted: 2021-04-30

## 2021-06-16 PROBLEM — G89.29 CHRONIC LEFT SHOULDER PAIN: Status: ACTIVE | Noted: 2018-12-31

## 2021-06-16 PROBLEM — Z00.00 ROUTINE GENERAL MEDICAL EXAMINATION AT A HEALTH CARE FACILITY: Status: ACTIVE | Noted: 2019-11-25

## 2021-06-17 NOTE — PROGRESS NOTES
Fred was seen today for initial functional medicine consult.    Diagnoses and all orders for this visit:    Hypothyroidism, unspecified type  -     T3 (Triiodothyronine), Free; Future  -     Triiodothyronine (T3), Reverse; Future  -     T4, Free; Future  -     Thyroid Stimulating Hormone (TSH); Future    Chronic ulcerative colitis without complication, unspecified location (H)  -     C -Reactive Protein, High Sensitivity; Future  -     Zinc, Serum or Plasma; Future  -     Magnesium, Red Blood Cell; Future    Patient Instructions   Return for fasting lab work.    Here are two references which provide a functional medicine approach to autoimmune disease:    The Autoimmune Solution by Dr. Courtney Presley.    The Immune System Recovery Plan by Dr. Chelle Lim    Consider adding in a variety of vegetables/ berries and some whole carbohydrates like buckwheat/ oats    Consider fasting mimicking diet    Consider further functional medicine testing:    ALCAT - food sensitivity - [employer]    [facility] [employer] ( individualized nutrient needs) and GI Stool Effects - gdx.net      SUBJECTIVE: Fred Terry is a 46 y.o. male who presents for a functional medicine consult with the following concerns:    1) ulcerative colitis - Started in early 20s. Lot of life changes. Has been on a number of meds in past and currently on Lialda.  He has been in remission for 1 year.  He has 3-5 bowel movements a day - solid.  No blood.  No bloating / cramping.    2) Hypothyroidism - Diagnosed in late 20s.  Taking levothyroxine.  Energy is ok.    3) Itchy skin - Has vitiligo.  Has certain areas that itch.    4) Weight loss difficulty - Has been eating more of a keto diet.  Weight was up to 225 lb and lost weight down to 200 lb but has plateaued.    TIMELINE:    Antecedents ( Preconception / prenatal ): no, born [place], [place]    Triggers:  Birth - , unsure if breast fed  Early childhood- few ear infections  Adolescence - no  "illnesses  Young adulthood - Ulcerative Colitis  / hypothyroidism,  Worked in [employer] for many years  Adulthood-   Patient Active Problem List   Diagnosis     Primary Vitiligo     Hypothyroidism     Family history of early CAD     Chronic ulcerative colitis without complication (H)     Hypercholesteremia     Chronic left shoulder pain     Routine general medical examination at a health care facility     Insomnia, unspecified type    S/P Appy.    Mediators/ Perpetuators:    SH: Household- . 4 yo son, 2 yo daughter.  Has 17 yo daughter who lives elsewhere.  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  Wakes up sometimes.  Movement - Goes to gym 3-7 times a week.  Does weights/ cardio.  Nutrition - B- 5 egg whites and one whole egg.  Occ huff / sausage.  Sour cream / guac / hot sauce / cheddar cheese.  Double shot expresso / whipping cream / coconut milk / butter / erythritol.  L- chicken / protein/ broccoli or sauteed spinach / cream cheese / bone broth / olive oil.  D- protein / veggie.  1/2 gallon to 100 oz water / day.  Eats 7 am to 6 pm.  Alcohol- 2-4 drinks on weekends.  Some grains once a week.  Gluten - free.  Stressors - work/ family  Exercise helps  Spirituality - Raised Oriental orthodox.  Social connection - not great    Travel to [place] several times - Had diarrhea age 18.    MSQ: 67  Perceived Stress Scale: 26      OBJECTIVE: /70 (Patient Site: Left Arm, Patient Position: Sitting, Cuff Size: Adult Regular)   Pulse 63   Ht 5' 10\" (1.778 m)   Wt 207 lb (93.9 kg)   SpO2 99%   BMI 29.70 kg/m     GENERAL: Healthy, alert and no distress  EYES: Eyes grossly normal to inspection. No discharge or erythema, or obvious scleral/conjunctival abnormalities.  RESP: No audible wheeze, cough, or visible cyanosis.  No visible retractions or increased work of breathing.    NEURO: Cranial nerves grossly intact. Mentation and speech appropriate for " age.  PSYCH: Mentation appears normal, affect normal/bright, judgement and insight intact, normal speech and appearance well-groomed    Wt Readings from Last 3 Encounters:   05/06/21 207 lb (93.9 kg)   04/30/21 207 lb (93.9 kg)   11/25/19 217 lb (98.4 kg)         Total spent with patient face to face / discussing functional medicine and possible testing/ documentation:66 minutes    Soledad Lamb

## 2021-06-17 NOTE — PATIENT INSTRUCTIONS - HE
Wesley Campbell, PhD   - Why We Sleep   - search for author name and podcast for a number of interviews   - [facility] ([facility])     Dr. Lamb - Functional medicine consultation.

## 2021-06-17 NOTE — PATIENT INSTRUCTIONS - HE
Patient Instructions by Corry Cotto PT at 1/14/2019  8:00 AM     Author: Corry Cotto PT Service: -- Author Type: Physical Therapist    Filed: 1/14/2019  8:55 AM Encounter Date: 1/14/2019 Status: Signed    : Corry Cotto PT (Physical Therapist)             SIDELYING EXTERNAL ROTATION WITH TOWEL    Lie on your side with your elbow bent to 90 degrees. Place a rolled up towel between your arm and the side your body as shown.     Squeeze your shoulder blade back and down toward your buttocks and hold that position.     Next, roll your arm upwards from your stomach area towards the ceiling while maintaining your arm against the towel and with your shoulder blade held down and back the entire time. Lower your arm and repeat.   x10-20 reps x1-2 sets 2x/day  Attempt 1-2 lbs         Rotate hands out so they are slightly wider than elbows - then raise arms up wall x10-20 reps 1-2 sets 1-2x/day     SIDELYING TRUNK ROTATION    While lying on your side with your arms out-stretched in front of your body, slowly twist your upper body to the side and rotated your spine. Your arms and head should also be rotating along with the spine as shown. Keep head in line with spine.  Try to get shoulder blade down to bed/floor.   x10-15 seconds x3-5 reps 1-2x/day  After PT or gym exercises

## 2021-06-17 NOTE — PATIENT INSTRUCTIONS - HE
Return for fasting lab work.    Here are two references which provide a functional medicine approach to autoimmune disease:    The Autoimmune Solution by Dr. Courtney Presley.    The Immune System Recovery Plan by Dr. Chelle Lim    Consider adding in a variety of vegetables/ berries and some whole carbohydrates like buckwheat/ oats    Consider fasting mimicking diet    Consider further functional medicine testing:    ALCAT - food sensitivity - [employer]    [employer] ( individualized nutrient needs) and GI Stool Effects - gdx.[email or web address]

## 2021-06-17 NOTE — PATIENT INSTRUCTIONS - HE
Patient Instructions by Corry Cotto PT at 2/18/2019  7:30 AM     Author: Corry Cotto PT Service: -- Author Type: Physical Therapist    Filed: 2/18/2019  7:58 AM Encounter Date: 2/18/2019 Status: Signed    : Corry Cotto PT (Physical Therapist)         PRONE LETTER EXERCISES - Do 1 sided with arm over edge of mat 10-20 reps 1-2 sets Add 1-3 lbs when easy to perform.   PRONE RETRACTION EXTENSION - PRONE I    Lying face down with your arms by your side, slowly move your arms upward towards the ceiling as you squeeze your shoulder blades downwards and towards your spine.    PRONE T - BILATERAL - THUMBS UP    Lie face down with your elbow straight and arms out to the side. Next, set your scapula by retracting it towards your spine and downward towards your feet. Then, slowly raise your arms towards the ceiling keeping your elbow straight the entire time as shown.    Your thumbs should be pointed in the upward direction as your arm raises.    PRONE Y    Lying face down with your arms stretched out upwards as shown, slowly move your arms upward towards the ceiling as you squeeze your shoulder blades downward and towards your spine.             SIDELYING EXTERNAL ROTATION WITH TOWEL    Lie on your side with your elbow bent to 90 degrees. Place a rolled up towel between your arm and the side your body as shown.     Squeeze your shoulder blade back and down toward your buttocks and hold that position.     Next, roll your arm upwards from your stomach area towards the ceiling while maintaining your arm against the towel and with your shoulder blade held down and back the entire time. Lower your arm and repeat.   x10-20 reps x1-2 sets 2x/day  Attempt 1-2 lbs         Low with rotation component! - Could substitute for laying on your side rotation - x10-20 reps 1-2 sets 2-10 lbs - want fatigue but not heavy load    Rotate hands out so they are slightly wider than elbows - then raise arms up wall x10-20 reps 1-2  sets 1-2x/day     SIDELYING TRUNK ROTATION    While lying on your side with your arms out-stretched in front of your body, slowly twist your upper body to the side and rotated your spine. Your arms and head should also be rotating along with the spine as shown. Keep head in line with spine.  Try to get shoulder blade down to bed/floor.   x10-15 seconds x3-5 reps 1-2x/day  After PT or gym exercises      TOWEL STRETCH    Gently pull up your affected arm behind your back with the assist of a towel  x10-20 seconds x2-3 reps 1x/day  Not aggressive with pain!

## 2021-06-20 NOTE — PROGRESS NOTES
Assessment/Plan:    Fred was seen today for hospital visit follow up.    Diagnoses and all orders for this visit:    Appendicitis: s/p lap appy.  Doing well.  Additional pain medications prescribed to help with sleep.  NSAIDs are contraindicated given IBD.  We will continue narcotic analgesia through the weekend.  The patient will follow up with surgery as recommended at the time of discharge.  -     HYDROcodone-acetaminophen 5-325 mg per tablet; Take 1-2 tablets by mouth every 4 (four) hours as needed.     Hypothyroidism  Question absorption.  The patient will be taking his Synthroid at approximately 5 AM by setting an alarm.  We will recheck in 6-8 weeks.  If his TSH has not improved, will adjust his levothyroxine dose and refer to endocrinology.    Return in about 6 weeks (around 11/15/2018) for 6-week follow-up lab only visit..    Rad Garcia MD  _______________________________    Chief Complaint   Patient presents with     Hospital Visit Follow Up     [facility]      Subjective: Fred Terry is a 43 y.o. year old male who I have seen in clinic before who presents with the following acute complaint(s):    Hospital follow-up :   - last week.  Right lower quadrant stomach pain.  Worsened.     - heart burn in the hospital   - persistent pain at night   - he had GERD and anxiety in the hospital.  They evaluated him for PE in the hospital.    -  Appetite okay   - BM: normal    ROS: Complete review of systems obtained.  Pertinent items are listed above.     The following portions of the patient's history were reviewed and updated as appropriate: allergies, current medications, past medical history and problem list.     Objective:   /68 (Patient Site: Left Arm, Patient Position: Sitting, Cuff Size: Adult Large)  Pulse 76  Temp 97.9  F (36.6  C) (Oral)   Wt 210 lb (95.3 kg)  BMI 29.29 kg/m2  Gen: nad  Abd: s, nt, nd.  Three trocar sites without erythema, no drainage    No results found for this or any  previous visit (from the past 24 hour(s)).  Xr Chest 2 Views    Result Date: 9/29/2018  XR CHEST 2 VIEWS 9/29/2018 6:13 AM INDICATION: Chest pain, acute, pe suspected r/o pe COMPARISON: None. FINDINGS: Heart is normal in size. Mild elevation right hemidiaphragm. Small amount of bilateral lower lung atelectasis. Upper lungs clear. No pneumothorax. Old healed right clavicle fracture. Monitor electrodes.    Ct Abdomen Pelvis Without Oral With Iv Contrast    Result Date: 9/29/2018  CT ABDOMEN PELVIS WO ORAL W IV CONTRAST 9/28/2018 11:59 PM     INDICATION: Rlq pain rlq pain TECHNIQUE: CT abdomen and pelvis. Multiplanar reformation images (MPR). Dose reduction techniques were used. IV CONTRAST: Iohexol (Omni) 100 mL COMPARISON: None. FINDINGS: LUNG BASES: Negative. ABDOMEN: Large calcified gallstone. Liver, spleen, pancreas, adrenal glands are normal. There are a few tiny nonobstructing stones in the kidneys. No hydronephrosis or ureteric stones. The appendix is distended with mucosal hyperenhancement and surrounding inflammatory change compatible with acute appendicitis. Appendix measures 9 mm in diameter. PELVIS: Negative MUSCULOSKELETAL: Negative.     CONCLUSION: 1.  Acute appendicitis. 2.  Tiny nonobstructing renal stones. 3.  Cholelithiasis. 4.  Results called to Dr. Barger at 12:06 AM    Nm Lung Vq Scan    Result Date: 9/29/2018  NM LUNG VQ SCAN 9/29/2018 6:11 AM INDICATION: Chest pain, acute, pe suspected r/o pe TECHNIQUE: 46.1 mCi technetium 99m DTPA aerosol. 6.5 mCi technetium 99m MAA IV. COMPARISON: Chest radiograph from 9/29/2018. FINDINGS: The ventilation study demonstrates central clumping of activity. The perfusion study demonstrates homogeneous and symmetric distribution of radionuclide. No areas of mismatch.     CONCLUSION: 1.  Low probability of pulmonary emboli.         Additional History from Old Records Summarized (2): yes  Decision to Obtain Records (1): no  Radiology Tests Summarized or Ordered (1):  yes  Labs Reviewed or Ordered (1): yes  Medicine Test Summarized or Ordered (1): no  Independent Review of EKG or X-RAY(2 each): yes    This note has been dictated using voice recognition software. Any grammatical or context distortions are unintentional and inherent to the software

## 2021-06-20 NOTE — ANESTHESIA CARE TRANSFER NOTE
Last vitals:   Vitals:    09/29/18 0904   BP: 118/59   Pulse: 79   Resp: 16   Temp: 36.8  C (98.2  F)   SpO2: 100%     Patient's level of consciousness is awake  Spontaneous respirations: yes  Maintains airway independently: yes  Dentition unchanged: yes  Oropharynx: oropharynx clear of all foreign objects    QCDR Measures:  ASA# 20 - Surgical Safety Checklist: WHO surgical safety checklist completed prior to induction  PQRS# 430 - Adult PONV Prevention: 4558F - Pt received => 2 anti-emetic agents (different classes) preop & intraop  ASA# 8 - Peds PONV Prevention: NA - Not pediatric patient, not GA or 2 or more risk factors NOT present  PQRS# 424 - [place]ala-op Temp Management: 4559F - At least one body temp DOCUMENTED => 35.5C or 95.9F within required timeframe  PQRS# 426 - PACU Transfer Protocol: - Transfer of care checklist used  ASA# 14 - Acute Post-op Pain: ASA14B - Patient did NOT experience pain >= 7 out of 10

## 2021-06-20 NOTE — PROGRESS NOTES
"TCM DISCHARGE FOLLOW UP CALL    Discharge Date:  9/29/2018  Reason for hospital stay (discharge diagnosis)::  Appendicitis  Are you feeling better, the same or worse since your discharge?:  Patient is feeling better  Do you feel like you have a plan in the event of a health emergency?: Yes    As part of your discharge plan, were  home care services ordered for you?: No    Did you receive any new medications, or was there a change to your medications?: Yes    Are you taking those medications, or do you have any established regiment?:  Went over d/c meds and instructions with Pt. Pt taking per order  HYDROcodone-acetaminophen 5-325 mg per tablet  INSTRUCTIONS: Take 1-2 tablets by mouth every 4 (four) hours as needed.    senna-docusate 8.6-50 mg tablet  Also known as: PERICOLACE  INSTRUCTIONS: Take 1 tablet by mouth 2 (two) times a day.    Pt taking 2 tab vicodin \"usually before bed\"  Do you have any follow up visits scheduled with your PCP or Specialist?:  No  I'm glad to hear you're doing well and we want you to continue to do well. Your PCP would like to see you for a follow-up visit. Can we help set that up for your today?: Yes    (RN) Patient was assisted in making appointment and/or given number to [facility].  If there are immediate concerns, In Basket messge route to the PCP with a summary of concern.:  RN assisted patient in scheduling appt and Transferred to [facility] (10/4 1p dr Barragan)  RN NOTES::  Pt has had Bm since being home. States some incisional pain which he has been taking RX vicodin for. Pt states mostly needs at night. RN suggested pillow support and ice packs. Pt states he is doing well.     "

## 2021-06-20 NOTE — ANESTHESIA POSTPROCEDURE EVALUATION
Patient: Fred Terry  APPENDECTOMY, LAPAROSCOPIC  Anesthesia type: general    Patient location: PACU  Last vitals:   Vitals:    09/29/18 0930   BP: 117/64   Pulse: (!) 56   Resp: 17   Temp: 36.4  C (97.6  F)   SpO2: 100%     Post vital signs: stable  Level of consciousness: awake and responds to simple questions  Post-anesthesia pain: pain controlled  Post-anesthesia nausea and vomiting: no  Pulmonary: unassisted, return to baseline  Cardiovascular: stable and blood pressure at baseline  Hydration: adequate  Anesthetic events: no    QCDR Measures:  ASA# 11 - [place]ala-op Cardiac Arrest: ASA11B - Patient did NOT experience unanticipated cardiac arrest  ASA# 12 - [place]-op Mortality Rate: ASA12B - Patient did NOT die  ASA# 13 - PACU Re-Intubation Rate: ASA13B - Patient did NOT require a new airway mgmt  ASA# 10 - Composite Anes Safety: ASA10A - No serious adverse event    Additional Notes:

## 2021-06-20 NOTE — ANESTHESIA PREPROCEDURE EVALUATION
Anesthesia Evaluation      Patient summary reviewed   No history of anesthetic complications     Airway   Mallampati: I   Pulmonary - normal exam   (+) a smoker                         Cardiovascular - normal exam  (+) , hypercholesterolemia,      Neuro/Psych - negative ROS     Endo/Other    (+) hypothyroidism,      GI/Hepatic/Renal    (+)   chronic renal disease CRI,           Dental                         Anesthesia Plan  Planned anesthetic: general endotracheal    ASA 3 - emergent   Induction: intravenous   Anesthetic plan and risks discussed with: patient    Post-op plan: routine recovery

## 2021-06-22 NOTE — PROGRESS NOTES
"Assessment/Plan:    Acute pain of left shoulder  This patient has a muscular build and works out on a regular basis.  He has mild pain with certain movements (Lemons) which suggest rotator cuff injury/inflammation versus AC joint impingement.  Recommending scheduled anti-inflammatories and physical therapy.  We did discuss the utility of a MRI but given abnormality of shoulder x-ray will defer for now.  Consider orthopedic referral as well.    Return in about 4 weeks (around 1/28/2019) for recheck if not improving.    Rad Garcia MD  _______________________________    Chief Complaint   Patient presents with     Shoulder Pain     left x 6 weeks      Subjective: Fred Terry is a 43 y.o. year old male who I have seen in clinic before who presents with the following acute complaint(s):    Left shoulder pain:   - duration: 6 weeks   - wonders if he needs an MRI   - history of left arm pain   - left shoulder started suddenly.     - painful when he sleeps on his left side.   - initially, he was not able to lift.  Lifts weights (normal for patient who works out multiple times per week)   - a little better today   - job: .    ROS: Complete review of systems obtained.  Pertinent items are listed above.     The following portions of the patient's history were reviewed and updated as appropriate: allergies, current medications, past medical history and problem list.     Objective:   /60 (Patient Site: Left Arm, Patient Position: Sitting, Cuff Size: Adult Large)   Pulse (!) 56   Temp 98.4  F (36.9  C) (Oral)   Ht 5' 10.5\" (1.791 m)   Wt 213 lb (96.6 kg)   BMI 30.13 kg/m    gen: No acute distress  MSK: The patient has a muscular build.  His shoulders are carried horizontally.  Mildly positive Lemons.  Negative Neer's.  Negative empty can testing.  Mild reduction with active external range of motion on the right side in comparison to the left side.  No pain to palpation over the AC joint.  No " pain over the clavicle.  The scapula is nontender.  The cervical spine muscles are nontender.    Left shoulder x-ray: My personal interpretation-no fracture.  No arthritic changes.  Joint space preserved.      No results found for this or any previous visit (from the past 24 hour(s)).  Xr Shoulder Left 2 Or More Vws    Result Date: 12/31/2018  [facility] RADIOLOGY EXAM: XR SHOULDER LEFT 2 OR MORE VWS LOCATION: [facility] DATE/TIME: 12/31/2018 8:53 AM INDICATION: Pain in left shoulder COMPARISON: None. FINDINGS: Mild degenerative changes are present involving glenohumeral joint. Nothing acute. No fracture or dislocation.       Additional History from Old Records Summarized (2): no  Decision to Obtain Records (1): no  Radiology Tests Summarized or Ordered (1): yes  Labs Reviewed or Ordered (1): no  Medicine Test Summarized or Ordered (1): no  Independent Review of EKG or X-RAY(2 each): yes    This note has been dictated using voice recognition software. Any grammatical or context distortions are unintentional and inherent to the software

## 2021-06-23 NOTE — TELEPHONE ENCOUNTER
Refill Approved    Rx renewed per Medication Renewal Policy. Medication was last renewed on 8/3/18.    Lacy Ybarra, [facility] Triage/Med Refill 1/25/2019     Requested Prescriptions   Pending Prescriptions Disp Refills     levothyroxine (SYNTHROID, LEVOTHROID) 200 MCG tablet [Pharmacy Med Name: LEVOTHYROXINE 200 MCG TABLET] 90 tablet 1     Sig: TAKE 1 TABLET (200 MCG TOTAL) BY MOUTH DAILY AT 6:00 AM.    Thyroid Hormones Protocol Passed - 1/23/2019  9:08 AM       Passed - Provider visit in past 12 months or next 3 months    Last office visit with prescriber/PCP: 12/31/2018 Rad Garcia MD OR same dept: 12/31/2018 Rad Garcia MD OR same specialty: 12/31/2018 Rad Garcia MD  Last physical: Visit date not found Last MTM visit: Visit date not found   Next visit within 3 mo: Visit date not found  Next physical within 3 mo: Visit date not found  Prescriber OR PCP: Rad Garcia MD  Last diagnosis associated with med order: 1. Acquired hypothyroidism  - levothyroxine (SYNTHROID, LEVOTHROID) 200 MCG tablet [Pharmacy Med Name: LEVOTHYROXINE 200 MCG TABLET]; Take 1 tablet (200 mcg total) by mouth Daily at 6:00 am.  Dispense: 90 tablet; Refill: 1    If protocol passes may refill for 12 months if within 3 months of last provider visit (or a total of 15 months).            Passed - TSH on file in past 12 months for patient age 12 & older    TSH   Date Value Ref Range Status   11/28/2018 1.81 0.30 - 5.00 uIU/mL Final

## 2021-06-23 NOTE — PROGRESS NOTES
[facility]   Initial Evaluation    Patient Name: Fred Terry  Date of evaluation: 1/14/2019  Visit number: 1/12  Referring Provider: Rad Garcia MD  Referring Diagnosis: Acute pain of left shoulder  Visit Diagnosis:     ICD-10-CM    1. Acute pain of left shoulder M25.512    2. Generalized muscle weakness M62.81    3. Decreased range of motion of left shoulder M25.612      Assessment:      Fred Terry is a 43 y.o. male who presents to therapy today with chief complaints of acute L shoulder pain. Onset date of sx was insidious about 2 months ago.  Pt reported recent Medrol dose pack was very helpful with decreasing pain and improving shoulder motion but he continues to have difficulty and pain with use of L arm.  Pain symptoms are mild to moderate on anterior L shoulder.  Functional impairments include difficulty and pain with strength training, reaching with ADLs and lifting and caring for his 3 year old son.  Pt demo's signs and sx consistent with mild rotator cuff irritation with weakness and mild capsular motion loss.     Pt. is appropriate for skilled PT intervention as outlined in the Plan of Care (POC).  Pt. is a good candidate for skilled PT services to improve pain levels and function.    Goals:  Pt. will demonstrate/verbalize independence in self-management of condition in : 12 weeks  Pt. will be independent with home exercise program in : 12 weeks;Comment  Comment:: performing strength routine without pain or difficulty in 12 weeks  Pt. will report decreased intensity, frequency of : Pain;in 12 weeks;Comment  Comment:: minimal to none with dependent care and workouts  Pt. will have improved quality of sleep: waking less times/night;in 12 weeks    Patient will decrease : SPADI score;by _ points;for improved quality of function;in 12 weeks  by ___ points: 9      Patient's expectations/goals are realistic.    Barriers to Learning or Achieving Goals:  No Barriers.       Plan / Patient  Instructions:      Plan for next visit: Assess response to HEP for s/l ER, flex with ER with band up wall and reach and roll stretching. Reassess L ER and IR versus R and perform jt mobs if needed. Attempt prone l,t,y if able and IR stretch if needed.    Plan of Care:   Communication with: Referral Source  Patient Related Instruction: Nature of Condition;Treatment plan and rationale;Self Care instruction;Basis of treatment;Body mechanics;Posture;Precautions;Next steps;Expected outcome  Times per Week: 1  Number of Weeks: 12  Number of Visits: 12  Discharge Planning: when indicated  Precautions / Restrictions : none  Therapeutic Exercise: ROM;Stretching;Strengthening  Neuromuscular Reeducation: posture;TNE;core;other  Neuromuscular Re-education: neurodynamics  Manual Therapy: soft tissue mobilization;joint mobilization;muscle energy  Functional Training (ADL's): self care;ADL's    Treatment techniques, plan of care, and goals were discussed with the patient.  The patient agrees to the plan as outlined.  The plan of care is dynamic and will be modified on an ongoing basis.       Subjective:       Social information:   Occupation:   Work Status:Working full time    History of Present Illness:  L shoulder with insidous onset pain in November and noticing some flexibility issues. Pt sought medical attention when pain wasn't getting better and anti-inflammatories have helped improved flexibility and decrease pain. Pt is still have pain with strength training and is not back to PLOF in gym. Pt also can have pain with caring for his 3 year old and with ADLs involving reaching or lifting. Sleeping has just gotten better the last 2-3 nights without waking pt due to pain.    Pain Ratin  Pain rating at best: 0  Pain rating at worst: 7  Pain description: sharp and anterior shoulder    Patient reports benefit from:  movement or exercise , anti-inflammatory, pain medication       Objective:      Patient Outcome  "Measures :    Shoulder Pain and Disability Index (SPADI) in %: 58     Scores range from 0-100%, where a score of 0% represents minimal pain and maximal function. The minimal clincically important difference is a score reduction of 10%.    Precautions/Restrictions: None  Involved side: Left  Posture Observation:      General sitting posture is  normal.  Gait: WNL    Palpation: Tender anterior capsule L GH jt and long head of biceps tendon    ROM: R shoulder WNL with ER at 80   and IR at T8    L shoulder WNL with end range pain at flex and abd and ER 75  and IR T12 with pain    Cervical ROM WNL without shoulder sx    Strength: B UE grossly 5/5 except L ER 5-/5    Sensation: Intact to light touch B UE    Special tests: Positive Patte's test L shoulder, negative biceps load    Treatment Today      TREATMENT MINUTES COMMENTS   Evaluation 24 L shoulder   Self-care/ Home management     Manual therapy 8 Supine L GH jt mobs distraction and A/P grade II/III x30\" oscillations in open pack, mid range and end range ER and IR.   Neuromuscular Re-education     Therapeutic Activity     Therapeutic Exercises 16 Discussed eval findings and POC. HEP instruction per Patient Instructions with written handout given.    Gait training     Modality__________________                Total 48    Blank areas are intentional and mean the treatment did not include these items.        PT Evaluation Code: (Please list factors)  Patient History/Comorbidities: pt reports recently quit smoking 6 weeks ago  Examination: L shoulder  Clinical Presentation: stable  Clinical Decision Making: low    Patient History/  Comorbidities Examination  (body structures and functions, activity limitations, and/or participation restrictions) Clinical Presentation Clinical Decision Making (Complexity)   No documented Comorbidities or personal factors 1-2 Elements Stable and/or uncomplicated Low   1-2 documented comorbidities or personal factor 3 Elements Evolving " clinical presentation with changing characteristics Moderate   3-4 documented comorbidities or personal factors 4 or more Unstable and unpredictable High       Corry Cotto PT, DPT, OCS, CLT  1/14/2019  8:05 AM

## 2021-06-24 NOTE — PROGRESS NOTES
[facility] Daily Progress     Patient Name: Fred Terry  Date: 2019  Visit #:   Referring Provider: Rad Garcia MD  Referring Diagnosis: L shoulder pain  Visit Diagnosis:     ICD-10-CM    1. Acute pain of left shoulder M25.512    2. Generalized muscle weakness M62.81    3. Decreased range of motion of left shoulder M25.612        Assessment:     HEP/POC compliance is  good .  Patient demonstrates understanding/independence with home program.  Response to Intervention Great!  Patient is benefitting from skilled physical therapy and is making steady progress toward functional goals.    Goal Status:  Pt. will demonstrate/verbalize independence in self-management of condition in : 12 weeks - IMPROVING    Pt. will be independent with home exercise program in : 12 weeks;Comment  Comment:: performing strength routine without pain or difficulty in 12 weeks - IMPROVING    Pt. will report decreased intensity, frequency of : Pain;in 12 weeks;Comment  Comment:: minimal to none with dependent care and workouts - IMPROVING    Pt. will have improved quality of sleep: waking less times/night;in 12 weeks - MET    Patient will decrease : SPADI score;by _ points;for improved quality of function;in 12 weeks  by ___ points: 9      Plan / Patient Education:     Hold episode of care open x60 days as pt advances HEP and strength training at the gym. If no return in 60 days with D/C to I with HEP.   Thank you for this referral!    Subjective:     Pain Ratin  Pt reports doing well with gym, sleeping on L shoulder without difficulty and being able to play and care for son without pain.   Pt can have some pain with push press if he loads heavier so he is staying with lighter load.    Patient Outcome Measures:  Shoulder Pain and Disability Index (SPADI) in %: 58     Scores range from 0-100%, where a score of 0% represents minimal pain and maximal function. The minimal clincically important difference is a  score reduction of 10%. FROM INITIAL    Objective:     ROM  L shoulder ER 79  (was 75) R shoulder 80  and IR T8 L min pain end range and T8 R (L was T12)      Treatment Today      TREATMENT MINUTES COMMENTS   Evaluation     Self-care/ Home management     Manual therapy     Neuromuscular Re-education     Therapeutic Activity     Therapeutic Exercises 28 Discussed goals and activity levels at home and at gym. Performed and added to HEP per pt instructions and printed for home.   Gait training     Modality__________________                Total 28    Blank areas are intentional and mean the treatment did not include these items.       Corry Cotto PT, DPT, OCS, CLT  2/18/2019  7:32 AM

## 2021-06-26 ENCOUNTER — HEALTH MAINTENANCE LETTER (OUTPATIENT)
Age: 46
End: 2021-06-26

## 2021-06-28 NOTE — PROGRESS NOTES
Progress Notes by Rad Garcia MD at 11/25/2019  1:40 PM     Author: Rad Garcia MD Service: -- Author Type: Physician    Filed: 11/25/2019  3:23 PM Encounter Date: 11/25/2019 Status: Signed    : Rad Garcia MD (Physician)       MALE PREVENTATIVE EXAM    Assessment and Plan:     Problem List Items Addressed This Visit     Hypothyroidism     The patient and I discussed the most recent TSH measurement.  This was drawn approximately 1 week after he completed a month-long course of steroids for an ulcerative colitis flare.  We will plan to recheck his TSH today before adjusting his levothyroxine.         Relevant Orders    Thyroid Stimulating Hormone (TSH)    Family history of early CAD    Hypercholesteremia     Family history of early coronary artery disease in father with initial MI (or at least coronary event) in his 40s.  The patient's 10-year car vascular risk is 2%.  His cardiac calcium CT score in 2015 was low.  - Statins not indicated at this time based on the above work-up.  -Check lipids in 1 year.  - Continue to remain physically active  -Consider repeat of cardiac calcium CT score in 2020 or 2021.         Routine general medical examination at a health care facility - Primary     Patient presents for annual exam.  Fasting lab work was completed in October.  This was reviewed with the patient today.  We will repeat a TSH today as his previous may have been erroneous as result of Synthroid administration and treatment for ulcerative colitis.  The patient is up-to-date with preventative health recommendations.  He has a strong family history for dementia (Alzheimer's?)  As well as heart disease.  We discussed potential benefits of testing for ApoE genotype but deferred.  I would not order this test previously and would not be able to accurately interpret it.             Next follow up:  Return in about 1 year (around 11/25/2020) for Annual physical.    Immunization Review  Adult Imm  Review: No immunizations due today    Pt does not use tobacco products   I discussed the following with the patient:   Adult Healthy Living: Importance of regular exercise  Getting adequate sleep  Stress management  Herbal medications/alternative medical therapies    I have had an Advance Directives discussion with the patient.    Subjective:   Chief Complaint: Fred Terry is an 44 y.o. male here for a preventative health visit.     HPI:      Ketogenic style eating.      Healthy Habits  Are you taking a daily aspirin? No  Do you typically exercising at least 40 min, 3-4 times per week?  Yes  Do you usually eat at least 4 servings of fruit and vegetables a day, include whole grains and fiber and avoid regularly eating high fat foods? Yes  Have you had an eye exam in the past two years? NO  Do you see a dentist twice per year? Yes  Do you have any concerns regarding sleep? No    Safety Screen  If you own firearms, are they secured in a locked gun cabinet or with trigger locks? Yes  Do you feel you are safe where you are living?: Yes (11/25/2019  1:51 PM)  Do you feel you are safe in your relationship(s)?: Yes (11/25/2019  1:51 PM)      Review of Systems:  Please see above.  The rest of the review of systems are negative for all systems.     Cancer Screening     Patient has no health maintenance due at this time          Patient Care Team:  Rad Garcia MD as PCP - General (Family Medicine)  Rad Garcia MD as Assigned PCP  Juan Alberto Childers  (Gastroenterology)        History     Reviewed By Date/Time Sections Reviewed    Rad Garcia MD 11/25/2019  2:25 PM Surgical    Rad Garcia MD 11/25/2019  2:10 PM Medical, Surgical, Tobacco, Family    Lamar Lynch LPN 11/25/2019  1:55 PM Family    Lamar Lynhc LPN 11/25/2019  1:54 PM Surgical, Family    Lamar Lynch LPN 11/25/2019  1:53 PM Tobacco, Alcohol, Drug Use, Sexual Activity    Lamar Lynch LPN  "11/25/2019  1:51 PM Tobacco            Objective:   Vital Signs:   Visit Vitals  /60 (Patient Site: Left Arm, Patient Position: Sitting, Cuff Size: Adult Large)   Pulse 72   Temp 98.4  F (36.9  C) (Oral)   Resp 20   Ht 5' 10\" (1.778 m)   Wt 217 lb (98.4 kg)   SpO2 98% Comment: room air   BMI 31.14 kg/m         PHYSICAL EXAM  Physical Exam  Vitals signs reviewed.   Constitutional:       General: He is not in acute distress.     Appearance: He is well-developed.   HENT:      Head: Normocephalic and atraumatic.      Right Ear: External ear normal.      Left Ear: External ear normal.      Nose: Nose normal.      Mouth/Throat:      Pharynx: No oropharyngeal exudate.   Eyes:      General: No scleral icterus.        Right eye: No discharge.         Left eye: No discharge.      Conjunctiva/sclera: Conjunctivae normal.      Pupils: Pupils are equal, round, and reactive to light.   Neck:      Musculoskeletal: Normal range of motion.      Thyroid: No thyromegaly.   Cardiovascular:      Rate and Rhythm: Normal rate and regular rhythm.      Heart sounds: Normal heart sounds. No murmur. No friction rub. No gallop.    Pulmonary:      Effort: Pulmonary effort is normal. No respiratory distress.      Breath sounds: Normal breath sounds. No wheezing.   Abdominal:      General: There is no distension.      Palpations: Abdomen is soft. There is no mass.      Tenderness: There is no abdominal tenderness.   Musculoskeletal: Normal range of motion.   Lymphadenopathy:      Cervical: No cervical adenopathy.   Skin:     General: Skin is warm.          Neurological:      Mental Status: He is alert and oriented to person, place, and time.      Cranial Nerves: No cranial nerve deficit.      Motor: No abnormal muscle tone.      Deep Tendon Reflexes: Reflexes are normal and symmetric.   Psychiatric:         Behavior: Behavior normal.         Thought Content: Thought content normal.         Judgment: Judgment normal.           The 10-year " ASCVD risk score (Yolanda GREY Jr., et al., 2013) is: 1.6%    Values used to calculate the score:      Age: 44 years      Sex: Male      Is Non- : No      Diabetic: No      Tobacco smoker: No      Systolic Blood Pressure: 122 mmHg      Is BP treated: No      HDL Cholesterol: 70 mg/dL      Total Cholesterol: 246 mg/dL         Medication List          Accurate as of November 25, 2019  3:23 PM. If you have any questions, ask your nurse or doctor.            CONTINUE taking these medications    CHANTIX CONTINUING MONTH BOX 1 mg tablet  INSTRUCTIONS:  TAKE 1 TABLET (1 MG TOTAL) BY MOUTH 2 (TWO) TIMES A DAY. TAKE WITH FULL GLASS OF WATER.  Generic drug:  varenicline        clobetasol 0.05 % ointment  Also known as:  TEMOVATE  INSTRUCTIONS:  Apply topically 2 (two) times a day.        levothyroxine 200 MCG tablet  Also known as:  SYNTHROID, LEVOTHROID  INSTRUCTIONS:  TAKE 1 TABLET (200 MCG TOTAL) BY MOUTH DAILY AT 6:00 AM.        mesalamine 1.2 gram EC tablet  Also known as:  LIALDA  INSTRUCTIONS:  Take 2.4 mg by mouth daily with breakfast.        mometasone 0.1 % ointment  Also known as:  ELOCON  INSTRUCTIONS:  Apply topically 2 (two) times a day.        multivitamin with minerals 9 mg iron-400 mcg Tab tablet  Also known as:  THERA-M  INSTRUCTIONS:  Take 1 tablet by mouth 2 (two) times a day.         triamcinolone 0.1 % cream  Also known as:  KENALOG  INSTRUCTIONS:  Apply topically 2 (two) times a day.           STOP taking these medications    methylPREDNISolone 4 mg tablet  Also known as:  MEDROL DOSEPACK  Stopped by:  Rad Garcia MD            Additional Screenings Completed Today:

## 2021-06-30 NOTE — PROGRESS NOTES
Progress Notes by Rad Garcia MD at 4/30/2021  2:20 PM     Author: Rad Garcia MD Service: -- Author Type: Physician    Filed: 4/30/2021  3:53 PM Encounter Date: 4/30/2021 Status: Signed    : Rad Garcia MD (Physician)       MALE PREVENTATIVE EXAM    Assessment and Plan:     Patient has been advised of split billing requirements and indicates understanding: Yes    Routine general medical examination at a health care facility  Overall, the patient says these been doing well.  He has been working to improve his nutrition and in general avoids processed/refined/sugar filled carbohydrates.  He is frustrated that his weight has stabilized.  He is concerned about central adiposity.  Inflammatory bowel disease (on mesalamine) has been stable.  Fasting lab work will be done in the near future.  Given his concerns about multiple autoimmune disorders as well as struggling with weight he is interested in a functional medicine consultation with Dr. Lamb.  He was scheduled for this appointment next week.  Tdap ordered.  Patient declines Covid vaccination scheduling assistance.    Insomnia, unspecified type  We discussed sleep hygiene, cognitive behavioral therapy for insomnia.  He is struggling with maintaining sleep.  We reviewed the risks and benefits of a trial of trazodone.  This medication was sent to the pharmacy.  He has been on this previously.    Hypothyroidism  Stable.  Check TSH.    Next follow up:  Return in about 1 year (around 4/30/2022) for Annual physical.    Immunization Review  Adult Imm Review: Due today, orders placed  Pt does not use tobacco products   I discussed the following with the patient:   Adult Healthy Living: Importance of regular exercise  Healthy nutrition  Getting adequate sleep  Stress management  Herbal medications/alternative medical therapies    I have had an Advance Directives discussion with the patient.    Subjective:   Chief Complaint: Fred Terry is an  "46 y.o. male here for a preventative health visit.    Patient has been advised of split billing requirements and indicates understanding: Yes    HPI:      Working to improve his relationship with his 18 year old daughter.     Weight: stuck at 200.  Nicole busby.  Works out on a regular basis.  Stress has been high.  Sleep has been somewhat fragmented.  He says that he sometimes struggles with sleep initiation.  Energy is generally reasonably high during the day.    Healthy Habits  Are you taking a daily aspirin? No  Do you typically exercising at least 40 min, 3-4 times per week?  Yes  Do you usually eat at least 4 servings of fruit and vegetables a day, include whole grains and fiber and avoid regularly eating high fat foods? NO  Have you had an eye exam in the past two years? Yes  Do you see a dentist twice per year? Yes  Do you have any concerns regarding sleep? No    Safety Screen  If you own firearms, are they secured in a locked gun cabinet or with trigger locks? NO  Do you feel you are safe where you are living?: Yes (4/30/2021  2:20 PM)  Do you feel you are safe in your relationship(s)?: Yes (4/30/2021  2:20 PM)      Review of Systems:  Please see above.  The rest of the review of systems are negative for all systems.     Cancer Screening     Patient has no health maintenance due at this time        Patient Care Team:  Rad Garcia MD as PCP - General (Family Medicine)  Rad Garcia MD as Assigned PCP  Juan Alberto Childers  (Gastroenterology)  Dr. Gotti  (Chiropractic Medicine)    History     Reviewed By Date/Time Sections Reviewed    Lamar Lynch LPN 4/30/2021  2:23 PM Tobacco, Alcohol, Drug Use, Sexual Activity    Lamar Lynch LPN 4/30/2021  2:20 PM Surgical, Family            Objective:   Vital Signs:   Visit Vitals  /76 (Patient Site: Left Arm, Patient Position: Sitting, Cuff Size: Adult Large)   Pulse 72   Temp 98.2  F (36.8  C) (Oral)   Resp 14   Ht 5' 10\" (1.778 m)   Wt " 207 lb (93.9 kg)   SpO2 95% Comment: room air   BMI 29.70 kg/m           PHYSICAL EXAM  Physical Exam  Vitals signs reviewed.   Constitutional:       General: He is not in acute distress.     Appearance: He is well-developed.   HENT:      Head: Normocephalic and atraumatic.      Right Ear: External ear normal.      Left Ear: External ear normal.      Nose: Nose normal.      Mouth/Throat:      Pharynx: No oropharyngeal exudate.   Eyes:      General: No scleral icterus.        Right eye: No discharge.         Left eye: No discharge.      Conjunctiva/sclera: Conjunctivae normal.      Pupils: Pupils are equal, round, and reactive to light.   Neck:      Musculoskeletal: Normal range of motion.      Thyroid: No thyromegaly.   Cardiovascular:      Rate and Rhythm: Normal rate and regular rhythm.      Heart sounds: Normal heart sounds. No murmur. No friction rub. No gallop.    Pulmonary:      Effort: Pulmonary effort is normal. No respiratory distress.      Breath sounds: Normal breath sounds. No wheezing.   Abdominal:      General: There is no distension.      Palpations: Abdomen is soft. There is no mass.      Tenderness: There is no abdominal tenderness.   Musculoskeletal: Normal range of motion.   Lymphadenopathy:      Cervical: No cervical adenopathy.   Skin:     General: Skin is warm.   Neurological:      Mental Status: He is alert and oriented to person, place, and time.      Cranial Nerves: No cranial nerve deficit.      Motor: No abnormal muscle tone.      Deep Tendon Reflexes: Reflexes are normal and symmetric.   Psychiatric:         Behavior: Behavior normal.         Thought Content: Thought content normal.         Judgment: Judgment normal.       The 10-year ASCVD risk score (Yolandadeyanira GREY Jr., et al., 2013) is: 2.1%    Values used to calculate the score:      Age: 46 years      Sex: Male      Is Non- : No      Diabetic: No      Tobacco smoker: No      Systolic Blood Pressure: 126 mmHg      Is  BP treated: No      HDL Cholesterol: 70 mg/dL      Total Cholesterol: 246 mg/dL         Medication List          Accurate as of April 30, 2021  3:53 PM. If you have any questions, ask your nurse or doctor.            START taking these medications    traZODone 50 MG tablet  Also known as: DESYREL  INSTRUCTIONS: Take 1-2 tablets ( mg total) by mouth at bedtime.  Started by: Rad Garcia MD           CONTINUE taking these medications    Chantix Continuing Month Box 1 mg tablet  INSTRUCTIONS: TAKE 1 TABLET (1 MG TOTAL) BY MOUTH 2 (TWO) TIMES A DAY. TAKE WITH FULL GLASS OF WATER.  Generic drug: varenicline        clobetasoL 0.05 % ointment  Also known as: TEMOVATE  INSTRUCTIONS: Apply topically 2 (two) times a day.        levothyroxine 200 MCG tablet  Also known as: SYNTHROID, LEVOTHROID  INSTRUCTIONS: Take 1 tablet (200 mcg total) by mouth Daily at 6:00 am.        mesalamine 1.2 gram EC tablet  Also known as: LIALDA  INSTRUCTIONS: Pt takes 4 tablets per day        multivitamin with minerals 9 mg iron-400 mcg Tab tablet  Also known as: THERA-M  INSTRUCTIONS: Take 1 tablet by mouth 2 (two) times a day.            STOP taking these medications    mometasone 0.1 % ointment  Also known as: ELOCON  Stopped by: Rad Garcia MD     triamcinolone 0.1 % cream  Also known as: KENALOG  Stopped by: Rad Garcia MD           Where to Get Your Medications      These medications were sent to [facility] [id number] [place], [place] - [address] [facility]  [address] [address]    Phone: [phone]     traZODone 50 MG tablet         Additional Screenings Completed Today:

## 2021-07-03 NOTE — ADDENDUM NOTE
Addendum Note by Rad Allen MD at 11/25/2019  1:40 PM     Author: Rad Allen MD Service: -- Author Type: Physician    Filed: 11/26/2019  6:11 AM Encounter Date: 11/25/2019 Status: Signed    : Rad Allen MD (Physician)    Addended by: RAD ALLEN on: 11/26/2019 06:11 AM        Modules accepted: Orders

## 2021-10-16 ENCOUNTER — HEALTH MAINTENANCE LETTER (OUTPATIENT)
Age: 46
End: 2021-10-16

## 2021-10-26 ENCOUNTER — LAB (OUTPATIENT)
Dept: LAB | Facility: CLINIC | Age: 46
End: 2021-10-26
Payer: COMMERCIAL

## 2021-10-26 ENCOUNTER — TRANSFERRED RECORDS (OUTPATIENT)
Dept: HEALTH INFORMATION MANAGEMENT | Facility: CLINIC | Age: 46
End: 2021-10-26
Payer: COMMERCIAL

## 2021-11-16 ENCOUNTER — TELEPHONE (OUTPATIENT)
Dept: FAMILY MEDICINE | Facility: CLINIC | Age: 46
End: 2021-11-16
Payer: COMMERCIAL

## 2021-11-16 NOTE — TELEPHONE ENCOUNTER
FYI: F/u on lab result video appointment scheduled on 11/29/21 at 8 am for pt. Lab result e-mail and mail original copy to pt today. xl

## 2021-11-17 ENCOUNTER — TRANSFERRED RECORDS (OUTPATIENT)
Dept: HEALTH INFORMATION MANAGEMENT | Facility: CLINIC | Age: 46
End: 2021-11-17
Payer: COMMERCIAL

## 2021-11-29 ENCOUNTER — VIRTUAL VISIT (OUTPATIENT)
Dept: FAMILY MEDICINE | Facility: CLINIC | Age: 46
End: 2021-11-29
Payer: COMMERCIAL

## 2021-11-29 DIAGNOSIS — K51.90 CHRONIC ULCERATIVE COLITIS WITHOUT COMPLICATION, UNSPECIFIED LOCATION (H): Primary | ICD-10-CM

## 2021-11-29 DIAGNOSIS — E03.9 HYPOTHYROIDISM, UNSPECIFIED TYPE: ICD-10-CM

## 2021-11-29 PROCEDURE — 99213 OFFICE O/P EST LOW 20 MIN: CPT | Mod: GT | Performed by: FAMILY MEDICINE

## 2021-11-29 NOTE — PATIENT INSTRUCTIONS
To schedule a complementary 30 minute consult for the ALCAT test go to: [email or web address]  To download a guide for the ALCAT results go to: [email or web address]     I recommend removing severe foods from diet for 6 months and moderate foods from diet for 3 months.  Rotate foods in mild category.  Can reintroduce foods after removing for indicated times.    Work on reducing stress.    Update me in 3 months with My Chart message

## 2021-11-29 NOTE — PROGRESS NOTES
"Fred is a 46 year old who is being evaluated via a billable video visit.      How would you like to obtain your AVS? MyChart  If the video visit is dropped, the invitation should be resent by: Text to cell phone: [phone]  Will anyone else be joining your video visit? No      Video Start Time: 8:05 am    Assessment & Plan     Chronic ulcerative colitis without complication, unspecified location (H)  Stable- hs CRP low.  Follow elimination diet to remove possible triggers.    Hypothyroidism, unspecified type  Stable but RT3 high normal so work on reducing stress.    Patient Instructions   To schedule a complementary 30 minute consult for the ALCAT test go to: [email or web address]  To download a guide for the ALCAT results go to: [employer]!/guide     I recommend removing severe foods from diet for 6 months and moderate foods from diet for 3 months.  Rotate foods in mild category.  Can reintroduce foods after removing for indicated times.    Work on reducing stress.    Update me in 3 months with My Chart message     Consider [employer] testing in future for functional nutritional recommendations.               BMI:   Estimated body mass index is 29.7 kg/m  as calculated from the following:    Height as of 5/6/21: 1.778 m (5' 10\").    Weight as of 5/6/21: 93.9 kg (207 lb).   Weight management plan: nutrition/ elimination diet/ fasting    See Patient Instructions    Return in about 3 months (around 2/28/2022) for Follow up.    Soledad Lamb MD  [facility]    Mike Ibarra is a 46 year old who presents for the following health issues - Follow-up functional medicine consult/ test results-:    HPI   He has h/o Crohn's and hypothyroidism.  He is here to f/u on ALCAT food sensitivity testing.  He tried some fasting and lost a little weight.  He has been under a lot of stress at work - does contract work as . Also some family stress.  Has " new lab puppy.  His wife is helpful with lifestyle changes.          Review of Systems         Objective           Vitals:  No vitals were obtained today due to virtual visit.    Physical Exam   GENERAL: Healthy, alert and no distress  EYES: Eyes grossly normal to inspection.  No discharge or erythema, or obvious scleral/conjunctival abnormalities.  RESP: No audible wheeze, cough, or visible cyanosis.  No visible retractions or increased work of breathing.    SKIN: Visible skin clear. No significant rash, abnormal pigmentation or lesions.  NEURO: Cranial nerves grossly intact.  Mentation and speech appropriate for age.  PSYCH: Mentation appears normal, affect normal/bright, judgement and insight intact, normal speech and appearance well-groomed.    ALCAT testing: severe - scallop, moderate - amaranth/black beans/ black tea/ cabbage/ cumin/ malt/ onion/ peanut/ pistachio/ radish / red beet/ rye/ salmon/ sesame/ red snapper/ sole/ soy/ vanilla/ watermelon/ potato    Candida- no reaction, Gluten/ gliadin- no reaction, casein and whey - mild reaction    Reviewed labs from last visit - RT3 is high normal                Video-Visit Details    Type of service:  Video Visit    Video End Time:8:33 AM    Originating Location (pt. Location): Home    Distant Location (provider location):  [facility]     Platform used for Video Visit: [employer]     25 minutes spent face to face/ reviewing labs/ discussing plan of care/ documentation.    Soledad Lamb MD

## 2022-01-04 DIAGNOSIS — E03.9 ACQUIRED HYPOTHYROIDISM: ICD-10-CM

## 2022-01-06 NOTE — TELEPHONE ENCOUNTER
"Routing refill request to provider for review/approval because:  A break in medication      Last Written Prescription Date:  10/19/2020  Last Fill Quantity: 90,  # refills: 3   Last office visit provider: 11/29/21      Requested Prescriptions   Pending Prescriptions Disp Refills     levothyroxine (SYNTHROID/LEVOTHROID) 200 MCG tablet [Pharmacy Med Name: LEVOTHYROXINE 200 MCG TABLET] 90 tablet 3     Sig: TAKE 1 TABLET (200 MCG TOTAL) BY MOUTH DAILY AT 6:00 AM.       Thyroid Protocol Passed - 1/4/2022  9:39 AM        Passed - Patient is 12 years or older        Passed - Recent (12 mo) or future (30 days) visit within the authorizing provider's specialty     Patient has had an office visit with the authorizing provider or a provider within the authorizing providers department within the previous 12 mos or has a future within next 30 days. See \"Patient Info\" tab in inbasket, or \"Choose Columns\" in Meds & Orders section of the refill encounter.              Passed - Medication is active on med list        Passed - Normal TSH on file in past 12 months     Recent Labs   Lab Test 05/18/21  0738   TSH 0.53                   Leyla Matias RN 01/06/22 3:26 PM  "

## 2022-01-07 RX ORDER — LEVOTHYROXINE SODIUM 200 UG/1
TABLET ORAL
Qty: 90 TABLET | Refills: 3 | Status: SHIPPED | OUTPATIENT
Start: 2022-01-07 | End: 2023-01-12

## 2022-01-30 DIAGNOSIS — G47.00 INSOMNIA, UNSPECIFIED TYPE: Primary | ICD-10-CM

## 2022-02-01 RX ORDER — TRAZODONE HYDROCHLORIDE 50 MG/1
TABLET, FILM COATED ORAL
Qty: 90 TABLET | Refills: 3 | Status: SHIPPED | OUTPATIENT
Start: 2022-02-01 | End: 2022-06-14

## 2022-02-01 NOTE — TELEPHONE ENCOUNTER
"Outpatient Medication Detail     Disp Refills Start End GSISEL   traZODone (DESYREL) 50 MG tablet 60 tablet 2 4/30/2021  --   Sig - Route: Take 1-2 tablets ( mg total) by mouth at bedtime. - Oral   Sent to pharmacy as: traZODone 50 mg tablet (DESYREL)   E-Prescribing Status: Receipt confirmed by pharmacy (4/30/2021  3:09 PM CDT)       Last office visit provider:  11/29/21     Requested Prescriptions   Pending Prescriptions Disp Refills     traZODone (DESYREL) 50 MG tablet [Pharmacy Med Name: TRAZODONE 50 MG TABLET] 60 tablet 2     Sig: TAKE 1-2 TABLETS BY MOUTH AT BEDTIME.       Serotonin Modulators Failed - 1/30/2022  9:33 AM        Failed - Medication is active on med list        Passed - Recent (12 mo) or future (30 days) visit within the authorizing provider's specialty     Patient has had an office visit with the authorizing provider or a provider within the authorizing providers department within the previous 12 mos or has a future within next 30 days. See \"Patient Info\" tab in inbasket, or \"Choose Columns\" in Meds & Orders section of the refill encounter.              Passed - Patient is age 18 or older             Pete Padilla RN 02/01/22 9:12 AM  "

## 2022-04-07 ENCOUNTER — TRANSFERRED RECORDS (OUTPATIENT)
Dept: HEALTH INFORMATION MANAGEMENT | Facility: CLINIC | Age: 47
End: 2022-04-07
Payer: COMMERCIAL

## 2022-04-25 ENCOUNTER — TELEPHONE (OUTPATIENT)
Dept: FAMILY MEDICINE | Facility: CLINIC | Age: 47
End: 2022-04-25
Payer: COMMERCIAL

## 2022-04-25 DIAGNOSIS — E78.00 HYPERCHOLESTEREMIA: Primary | ICD-10-CM

## 2022-04-25 DIAGNOSIS — Z13.1 SCREENING FOR DIABETES MELLITUS: ICD-10-CM

## 2022-04-25 DIAGNOSIS — Z13.220 SCREENING FOR LIPID DISORDERS: ICD-10-CM

## 2022-04-25 DIAGNOSIS — Z11.4 SCREENING FOR HIV WITHOUT PRESENCE OF RISK FACTORS: ICD-10-CM

## 2022-04-25 DIAGNOSIS — Z11.59 ENCOUNTER FOR HEPATITIS C SCREENING TEST FOR LOW RISK PATIENT: ICD-10-CM

## 2022-04-25 DIAGNOSIS — E03.9 HYPOTHYROIDISM, UNSPECIFIED TYPE: ICD-10-CM

## 2022-04-25 NOTE — TELEPHONE ENCOUNTER
Pt calling he has scheduled physical on 5/27 and request to have labs done week prior to discuss results at physical appt.  He is looking to have cholesterol and tyroid checked for sure and what ever else you think would be needed.  Pt has been informed that things may come up at appt that will require another lab to be done day of visit he is ok with this.  Please advise on lab orders and I will call him back to schedule lab only appt week prior to his physical

## 2022-04-26 DIAGNOSIS — K51.00 ULCERATIVE CHRONIC PANCOLITIS WITHOUT COMPLICATIONS (H): Primary | ICD-10-CM

## 2022-05-11 ENCOUNTER — LAB (OUTPATIENT)
Dept: LAB | Facility: CLINIC | Age: 47
End: 2022-05-11
Payer: COMMERCIAL

## 2022-05-11 DIAGNOSIS — K51.00 ULCERATIVE CHRONIC PANCOLITIS WITHOUT COMPLICATIONS (H): ICD-10-CM

## 2022-05-11 DIAGNOSIS — Z11.59 ENCOUNTER FOR HEPATITIS C SCREENING TEST FOR LOW RISK PATIENT: ICD-10-CM

## 2022-05-11 DIAGNOSIS — Z11.4 SCREENING FOR HIV WITHOUT PRESENCE OF RISK FACTORS: ICD-10-CM

## 2022-05-11 DIAGNOSIS — Z13.220 SCREENING FOR LIPID DISORDERS: ICD-10-CM

## 2022-05-11 DIAGNOSIS — Z13.1 SCREENING FOR DIABETES MELLITUS: ICD-10-CM

## 2022-05-11 DIAGNOSIS — E03.9 HYPOTHYROIDISM, UNSPECIFIED TYPE: ICD-10-CM

## 2022-05-11 DIAGNOSIS — E78.00 HYPERCHOLESTEREMIA: ICD-10-CM

## 2022-05-11 LAB
ALT SERPL W P-5'-P-CCNC: 47 U/L (ref 0–45)
ANION GAP SERPL CALCULATED.3IONS-SCNC: 10 MMOL/L (ref 5–18)
BUN SERPL-MCNC: 17 MG/DL (ref 8–22)
CALCIUM SERPL-MCNC: 9.7 MG/DL (ref 8.5–10.5)
CHLORIDE BLD-SCNC: 104 MMOL/L (ref 98–107)
CHOLEST SERPL-MCNC: 259 MG/DL
CO2 SERPL-SCNC: 25 MMOL/L (ref 22–31)
CREAT SERPL-MCNC: 0.98 MG/DL (ref 0.7–1.3)
FASTING STATUS PATIENT QL REPORTED: YES
GFR SERPL CREATININE-BSD FRML MDRD: >90 ML/MIN/1.73M2
GLUCOSE BLD-MCNC: 99 MG/DL (ref 70–125)
HDLC SERPL-MCNC: 71 MG/DL
HIV 1+2 AB+HIV1 P24 AG SERPL QL IA: NEGATIVE
LDLC SERPL CALC-MCNC: 171 MG/DL
POTASSIUM BLD-SCNC: 4.4 MMOL/L (ref 3.5–5)
SODIUM SERPL-SCNC: 139 MMOL/L (ref 136–145)
TRIGL SERPL-MCNC: 86 MG/DL
TSH SERPL DL<=0.005 MIU/L-ACNC: 0.94 UIU/ML (ref 0.3–5)

## 2022-05-11 PROCEDURE — 84443 ASSAY THYROID STIM HORMONE: CPT

## 2022-05-11 PROCEDURE — 84460 ALANINE AMINO (ALT) (SGPT): CPT

## 2022-05-11 PROCEDURE — 80061 LIPID PANEL: CPT

## 2022-05-11 PROCEDURE — 36415 COLL VENOUS BLD VENIPUNCTURE: CPT

## 2022-05-11 PROCEDURE — 80048 BASIC METABOLIC PNL TOTAL CA: CPT

## 2022-05-11 PROCEDURE — 87389 HIV-1 AG W/HIV-1&-2 AB AG IA: CPT

## 2022-05-11 PROCEDURE — 86803 HEPATITIS C AB TEST: CPT

## 2022-05-12 ENCOUNTER — TRANSFERRED RECORDS (OUTPATIENT)
Dept: HEALTH INFORMATION MANAGEMENT | Facility: CLINIC | Age: 47
End: 2022-05-12
Payer: COMMERCIAL

## 2022-05-12 LAB — HCV AB SERPL QL IA: NEGATIVE

## 2022-05-20 ENCOUNTER — TRANSFERRED RECORDS (OUTPATIENT)
Dept: HEALTH INFORMATION MANAGEMENT | Facility: CLINIC | Age: 47
End: 2022-05-20
Payer: COMMERCIAL

## 2022-06-08 ASSESSMENT — ENCOUNTER SYMPTOMS
HEMATOCHEZIA: 0
CHILLS: 0
MYALGIAS: 0
HEMATURIA: 0
DIARRHEA: 0
COUGH: 0
DYSURIA: 0
WEAKNESS: 0
CONSTIPATION: 0
NAUSEA: 0
FEVER: 0
HEARTBURN: 0
DIZZINESS: 0
PALPITATIONS: 0
NERVOUS/ANXIOUS: 0
SHORTNESS OF BREATH: 0
SORE THROAT: 0
EYE PAIN: 0
JOINT SWELLING: 0
HEADACHES: 0
PARESTHESIAS: 0
ARTHRALGIAS: 1
ABDOMINAL PAIN: 0
FREQUENCY: 0

## 2022-06-14 ENCOUNTER — OFFICE VISIT (OUTPATIENT)
Dept: FAMILY MEDICINE | Facility: CLINIC | Age: 47
End: 2022-06-14
Payer: COMMERCIAL

## 2022-06-14 VITALS
BODY MASS INDEX: 30.11 KG/M2 | DIASTOLIC BLOOD PRESSURE: 70 MMHG | TEMPERATURE: 98.3 F | HEIGHT: 70 IN | RESPIRATION RATE: 16 BRPM | HEART RATE: 56 BPM | WEIGHT: 210.31 LBS | SYSTOLIC BLOOD PRESSURE: 102 MMHG

## 2022-06-14 DIAGNOSIS — Z71.1 CONCERN ABOUT EYE DISEASE WITHOUT DIAGNOSIS: Primary | ICD-10-CM

## 2022-06-14 DIAGNOSIS — Z00.00 ROUTINE GENERAL MEDICAL EXAMINATION AT A HEALTH CARE FACILITY: ICD-10-CM

## 2022-06-14 DIAGNOSIS — Z82.49 FAMILY HISTORY OF EARLY CAD: ICD-10-CM

## 2022-06-14 DIAGNOSIS — R74.01 ELEVATED ALT MEASUREMENT: ICD-10-CM

## 2022-06-14 DIAGNOSIS — H43.391 VITREOUS FLOATERS OF RIGHT EYE: ICD-10-CM

## 2022-06-14 DIAGNOSIS — E78.00 HYPERCHOLESTEREMIA: ICD-10-CM

## 2022-06-14 DIAGNOSIS — K51.90 CHRONIC ULCERATIVE COLITIS WITHOUT COMPLICATION, UNSPECIFIED LOCATION (H): ICD-10-CM

## 2022-06-14 LAB
ALBUMIN SERPL-MCNC: 4.1 G/DL (ref 3.5–5)
ALP SERPL-CCNC: 53 U/L (ref 45–120)
ALT SERPL W P-5'-P-CCNC: 31 U/L (ref 0–45)
AST SERPL W P-5'-P-CCNC: 26 U/L (ref 0–40)
BILIRUB DIRECT SERPL-MCNC: 0.2 MG/DL
BILIRUB SERPL-MCNC: 0.6 MG/DL (ref 0–1)
PROT SERPL-MCNC: 7.2 G/DL (ref 6–8)

## 2022-06-14 PROCEDURE — 90471 IMMUNIZATION ADMIN: CPT | Performed by: FAMILY MEDICINE

## 2022-06-14 PROCEDURE — 90715 TDAP VACCINE 7 YRS/> IM: CPT | Performed by: FAMILY MEDICINE

## 2022-06-14 PROCEDURE — 99396 PREV VISIT EST AGE 40-64: CPT | Mod: 25 | Performed by: FAMILY MEDICINE

## 2022-06-14 PROCEDURE — 36415 COLL VENOUS BLD VENIPUNCTURE: CPT | Performed by: FAMILY MEDICINE

## 2022-06-14 PROCEDURE — 80076 HEPATIC FUNCTION PANEL: CPT | Performed by: FAMILY MEDICINE

## 2022-06-14 ASSESSMENT — ENCOUNTER SYMPTOMS
SORE THROAT: 0
COUGH: 0
HEADACHES: 0
HEARTBURN: 0
DYSURIA: 0
MYALGIAS: 0
WEAKNESS: 0
PARESTHESIAS: 0
ARTHRALGIAS: 1
FEVER: 0
NAUSEA: 0
SHORTNESS OF BREATH: 0
JOINT SWELLING: 0
HEMATOCHEZIA: 0
CHILLS: 0
CONSTIPATION: 0
DIARRHEA: 0
FREQUENCY: 0
EYE PAIN: 0
NERVOUS/ANXIOUS: 0
PALPITATIONS: 0
DIZZINESS: 0
HEMATURIA: 0
ABDOMINAL PAIN: 0

## 2022-06-14 NOTE — ASSESSMENT & PLAN NOTE
Weight stable.  Muscular build.  I estimate his percent body fat to be approximately 20%.  BMI overstates body habitus.

## 2022-06-14 NOTE — ASSESSMENT & PLAN NOTE
Annual exam.  Discussed heart disease risk given family history.  It has been 7 years since his cardiac calcium CT score.  ASCVD risk score is approximately 1.7% with improved HDL cholesterol.  We will plan to repeat his cardiac calcium CT score.  TSH in normal limits.  Exercise includes functional approach to fitness.

## 2022-06-14 NOTE — PROGRESS NOTES
SUBJECTIVE:   CC: Fred Terry is an 47 year old male who presents for preventative health visit.     Patient has been advised of split billing requirements and indicates understanding: Yes     Chief Complaint   Patient presents with     Physical     Pt. Nonfasting.     Skin concern:   - under the navel.   - dark    Nutrition:    - fasting mimcry (per Zainab).  He was able to lose weight.  Monthly for a week.  Weight was as low as 190.     - energy is okay   - generally low carb, high protein.     Healthy Habits:     Getting at least 3 servings of Calcium per day:  Yes    Bi-annual eye exam:  NO    Dental care twice a year:  Yes    Sleep apnea or symptoms of sleep apnea:  None    Diet:  Carbohydrate counting    Duration of exercise:  N/A    Taking medications regularly:  Yes    Medication side effects:  None    PHQ-2 Total Score: 0    Additional concerns today:  Yes    Today's PHQ-2 Score:   PHQ-2 (  [employer]) 2022   Q1: Little interest or pleasure in doing things 0   Q2: Feeling down, depressed or hopeless 0   PHQ-2 Score 0   Q1: Little interest or pleasure in doing things -   Q2: Feeling down, depressed or hopeless -   PHQ-2 Score -   Abuse: Current or Past(Physical, Sexual or Emotional)- No  Do you feel safe in your environment? Yes    Social History     Tobacco Use     Smoking status: Former Smoker     Quit date: 2015     Years since quittin.5     Smokeless tobacco: Never Used   Substance Use Topics     Alcohol use: Yes     Alcohol/week: 2.0 - 6.0 standard drinks     Comment: Alcoholic Drinks/day: beer and liquor         Alcohol Use 2022   Prescreen: >3 drinks/day or >7 drinks/week? Not Applicable       Last PSA: No results found for: PSA    Reviewed orders with patient. Reviewed health maintenance and updated orders accordingly - Yes    Reviewed and updated as needed this visit by clinical staff   Tobacco  Allergies  Meds   Med Hx  Surg Hx             Reviewed and updated as  "needed this visit by Provider       Med Hx  Surg Hx              Review of Systems   Constitutional: Negative for chills and fever.   HENT: Negative for congestion, ear pain, hearing loss and sore throat.    Eyes: Negative for pain and visual disturbance.   Respiratory: Negative for cough and shortness of breath.    Cardiovascular: Negative for chest pain, palpitations and peripheral edema.   Gastrointestinal: Negative for abdominal pain, constipation, diarrhea, heartburn, hematochezia and nausea.   Genitourinary: Negative for dysuria, frequency, genital sores, hematuria, impotence, penile discharge and urgency.   Musculoskeletal: Positive for arthralgias. Negative for joint swelling and myalgias.   Skin: Negative for rash.   Neurological: Negative for dizziness, weakness, headaches and paresthesias.   Psychiatric/Behavioral: Positive for mood changes. The patient is not nervous/anxious.      OBJECTIVE:   /70 (BP Location: Left arm, Patient Position: Sitting, Cuff Size: Adult Large)   Pulse 56   Temp 98.3  F (36.8  C) (Oral)   Resp 16   Ht 1.786 m (5' 10.3\")   Wt 95.4 kg (210 lb 5 oz)   BMI 29.92 kg/m      Physical Exam  Vitals reviewed.   Constitutional:       General: He is not in acute distress.     Appearance: Normal appearance. He is not ill-appearing.   HENT:      Head: Normocephalic and atraumatic.      Right Ear: External ear normal.      Left Ear: External ear normal.      Nose: Nose normal.      Mouth/Throat:      Pharynx: Oropharynx is clear. No oropharyngeal exudate or posterior oropharyngeal erythema.   Eyes:      General: No scleral icterus.        Right eye: No discharge.         Left eye: No discharge.      Extraocular Movements: Extraocular movements intact.      Conjunctiva/sclera: Conjunctivae normal.      Pupils: Pupils are equal, round, and reactive to light.   Neck:      Comments: No thyromegaly.  Cardiovascular:      Rate and Rhythm: Normal rate and regular rhythm.      Heart " sounds: Normal heart sounds. No murmur heard.    No friction rub. No gallop.   Pulmonary:      Effort: Pulmonary effort is normal. No respiratory distress.      Breath sounds: Normal breath sounds. No wheezing or rales.   Abdominal:      General: There is no distension.      Palpations: Abdomen is soft. There is no mass.      Tenderness: There is no abdominal tenderness.   Musculoskeletal:         General: No signs of injury. Normal range of motion.      Cervical back: Normal range of motion.      Right lower leg: No edema.      Left lower leg: No edema.   Lymphadenopathy:      Cervical: No cervical adenopathy.   Skin:     General: Skin is warm.      Coloration: Skin is not jaundiced.      Findings: No rash.   Neurological:      General: No focal deficit present.      Mental Status: He is alert and oriented to person, place, and time.      Cranial Nerves: No cranial nerve deficit.      Deep Tendon Reflexes: Reflexes normal.   Psychiatric:         Mood and Affect: Mood normal.         Diagnostic Test Results:  Labs reviewed in Epic    ASSESSMENT/PLAN:     Problem List Items Addressed This Visit     BMI 29.0-29.9,adult     Weight stable.  Muscular build.  I estimate his percent body fat to be approximately 20%.  BMI overstates body habitus.           Chronic ulcerative colitis without complication (H)     Stable.  Per [facility] ([facility]).            Concern about eye disease without diagnosis - Primary     Floater.  His symptoms stable.  Differential includes retinal abnormalities.  Funduscopic exam normal.  Referral to ophthalmology/optometry placed.           Family history of early CAD    Hypercholesteremia    Relevant Orders    CT Coronary Calcium Scan    Routine general medical examination at a health care facility     Annual exam.  Discussed heart disease risk given family history.  It has been 7 years since his cardiac calcium CT score.  ASCVD risk score is approximately 1.7% with improved HDL cholesterol.  We will  "plan to repeat his cardiac calcium CT score.  TSH in normal limits.  Exercise includes functional approach to fitness.             Other Visit Diagnoses     Vitreous floaters of right eye        Relevant Orders    Adult Eye Referral    Elevated ALT measurement        Relevant Orders    Hepatic panel (Albumin, ALT, AST, Bili, Alk Phos, TP)          Patient has been advised of split billing requirements and indicates understanding: Yes    The 10-year ASCVD risk score (Yolanda GREY Jr., et al., 2013) is: 1.7%    Values used to calculate the score:      Age: 47 years      Sex: Male      Is Non- : No      Diabetic: No      Tobacco smoker: No      Systolic Blood Pressure: 102 mmHg      Is BP treated: No      HDL Cholesterol: 71 mg/dL      Total Cholesterol: 259 mg/dL      COUNSELING:   Reviewed preventive health counseling, as reflected in patient instructions       Regular exercise       Healthy diet/nutrition    Estimated body mass index is 29.92 kg/m  as calculated from the following:    Height as of this encounter: 1.786 m (5' 10.3\").    Weight as of this encounter: 95.4 kg (210 lb 5 oz).     He reports that he quit smoking about 6 years ago. He has never used smokeless tobacco.      Counseling Resources:  ATP IV Guidelines  Pooled Cohorts Equation Calculator  FRAX Risk Assessment  ICSI Preventive Guidelines  Dietary Guidelines for Americans, 2010  USDA's MyPlate  ASA Prophylaxis  Lung CA Screening    Rad Garcia MD  [facility]  "

## 2022-06-14 NOTE — ASSESSMENT & PLAN NOTE
Floater.  His symptoms stable.  Differential includes retinal abnormalities.  Funduscopic exam normal.  Referral to ophthalmology/optometry placed.

## 2022-06-16 ENCOUNTER — TRANSFERRED RECORDS (OUTPATIENT)
Dept: HEALTH INFORMATION MANAGEMENT | Facility: CLINIC | Age: 47
End: 2022-06-16
Payer: COMMERCIAL

## 2022-07-01 ENCOUNTER — HOSPITAL ENCOUNTER (OUTPATIENT)
Dept: CT IMAGING | Facility: CLINIC | Age: 47
Discharge: HOME | End: 2022-07-01
Attending: FAMILY MEDICINE | Admitting: FAMILY MEDICINE

## 2022-07-01 DIAGNOSIS — E78.00 HYPERCHOLESTEREMIA: ICD-10-CM

## 2022-07-01 LAB
CV CALCIUM SCORE AGATSTON LM: 0
CV CALCIUM SCORING AGATSON LAD: 0
CV CALCIUM SCORING AGATSTON CX: 0
CV CALCIUM SCORING AGATSTON RCA: 0
CV CALCIUM SCORING AGATSTON TOTAL: 0

## 2022-07-01 PROCEDURE — 75571 CT HRT W/O DYE W/CA TEST: CPT | Mod: 26 | Performed by: INTERNAL MEDICINE

## 2022-07-01 PROCEDURE — 75571 CT HRT W/O DYE W/CA TEST: CPT

## 2022-07-19 ENCOUNTER — TRANSFERRED RECORDS (OUTPATIENT)
Dept: HEALTH INFORMATION MANAGEMENT | Facility: CLINIC | Age: 47
End: 2022-07-19

## 2022-09-25 ENCOUNTER — HEALTH MAINTENANCE LETTER (OUTPATIENT)
Age: 47
End: 2022-09-25

## 2023-01-11 DIAGNOSIS — E03.9 ACQUIRED HYPOTHYROIDISM: ICD-10-CM

## 2023-01-12 RX ORDER — LEVOTHYROXINE SODIUM 200 UG/1
200 TABLET ORAL DAILY
Qty: 90 TABLET | Refills: 1 | Status: SHIPPED | OUTPATIENT
Start: 2023-01-12 | End: 2023-06-27

## 2023-01-12 NOTE — TELEPHONE ENCOUNTER
"Routing refill request to provider for review/approval because:  Due to medication information not transferring due to [facility] please review the medication information prior to signing to ensure accuracy.    Last Written Prescription Date:  1/7/22  Last Fill Quantity: 90,  # refills: 3   Last office visit provider:  6/14/22     Requested Prescriptions   Pending Prescriptions Disp Refills     levothyroxine (SYNTHROID/LEVOTHROID) 200 MCG tablet [Pharmacy Med Name: LEVOTHYROXINE 200 MCG TABLET] 90 tablet 3     Sig: TAKE 1 TABLET (200 MCG TOTAL) BY MOUTH DAILY AT 6:00 AM.       Thyroid Protocol Passed - 1/12/2023 10:50 AM        Passed - Patient is 12 years or older        Passed - Recent (12 mo) or future (30 days) visit within the authorizing provider's specialty     Patient has had an office visit with the authorizing provider or a provider within the authorizing providers department within the previous 12 mos or has a future within next 30 days. See \"Patient Info\" tab in inbasket, or \"Choose Columns\" in Meds & Orders section of the refill encounter.              Passed - Medication is active on med list        Passed - Normal TSH on file in past 12 months     Recent Labs   Lab Test 05/11/22  0740   TSH 0.94                   Pete Padilla RN 01/12/23 10:51 AM  "

## 2023-05-01 ENCOUNTER — TRANSFERRED RECORDS (OUTPATIENT)
Dept: HEALTH INFORMATION MANAGEMENT | Facility: CLINIC | Age: 48
End: 2023-05-01
Payer: COMMERCIAL

## 2023-05-11 DIAGNOSIS — K51.30 CHRONIC ULCERATIVE RECTOSIGMOIDITIS WITHOUT COMPLICATIONS (H): Primary | ICD-10-CM

## 2023-05-11 DIAGNOSIS — E03.9 HYPOTHYROIDISM: ICD-10-CM

## 2023-05-15 ENCOUNTER — PATIENT OUTREACH (OUTPATIENT)
Dept: CARE COORDINATION | Facility: CLINIC | Age: 48
End: 2023-05-15
Payer: COMMERCIAL

## 2023-06-27 DIAGNOSIS — E03.9 ACQUIRED HYPOTHYROIDISM: ICD-10-CM

## 2023-06-27 RX ORDER — LEVOTHYROXINE SODIUM 200 UG/1
200 TABLET ORAL DAILY
Qty: 90 TABLET | Refills: 1 | Status: SHIPPED | OUTPATIENT
Start: 2023-06-27 | End: 2024-01-11

## 2023-06-27 NOTE — TELEPHONE ENCOUNTER
"Routing refill request to provider for review/approval because:  Labs not current:  TSH  Patient needs to be seen because it has been more than 1 year since last office visit. Upcoming OV 8/3/23    Last Written Prescription Date: 1/12/23  Last Fill Quantity: 90, # refills: 1  Last office visit provider: 6/14/22    Requested Prescriptions   Pending Prescriptions Disp Refills     levothyroxine (SYNTHROID/LEVOTHROID) 200 MCG tablet [Pharmacy Med Name: LEVOTHYROXINE 200 MCG TABLET] 81 tablet 1     Sig: TAKE 1 TABLET (200 MCG) BY MOUTH DAILY 30 MINUTES PRIOR TO BREAKFAST.       Thyroid Protocol Failed - 6/27/2023 12:38 AM        Failed - Recent (12 mo) or future (30 days) visit within the authorizing provider's specialty     Patient has had an office visit with the authorizing provider or a provider within the authorizing providers department within the previous 12 mos or has a future within next 30 days. See \"Patient Info\" tab in inbasket, or \"Choose Columns\" in Meds & Orders section of the refill encounter.              Failed - Normal TSH on file in past 12 months     Recent Labs   Lab Test 05/11/22  0740   TSH 0.94              Passed - Patient is 12 years or older        Passed - Medication is active on med list             Jovan Dong RN 06/27/23 11:11 AM    "

## 2023-08-03 ENCOUNTER — OFFICE VISIT (OUTPATIENT)
Dept: FAMILY MEDICINE | Facility: CLINIC | Age: 48
End: 2023-08-03
Payer: COMMERCIAL

## 2023-08-03 VITALS
SYSTOLIC BLOOD PRESSURE: 110 MMHG | DIASTOLIC BLOOD PRESSURE: 73 MMHG | BODY MASS INDEX: 28.66 KG/M2 | TEMPERATURE: 97.9 F | HEIGHT: 71 IN | WEIGHT: 204.7 LBS | OXYGEN SATURATION: 97 % | RESPIRATION RATE: 16 BRPM | HEART RATE: 55 BPM

## 2023-08-03 DIAGNOSIS — L80 VITILIGO: ICD-10-CM

## 2023-08-03 DIAGNOSIS — Z13.1 SCREENING FOR DIABETES MELLITUS: ICD-10-CM

## 2023-08-03 DIAGNOSIS — R09.81 CONGESTION OF PARANASAL SINUS: ICD-10-CM

## 2023-08-03 DIAGNOSIS — E03.9 HYPOTHYROIDISM, UNSPECIFIED TYPE: ICD-10-CM

## 2023-08-03 DIAGNOSIS — K51.90 CHRONIC ULCERATIVE COLITIS WITHOUT COMPLICATION, UNSPECIFIED LOCATION (H): ICD-10-CM

## 2023-08-03 DIAGNOSIS — E78.00 HYPERCHOLESTEREMIA: ICD-10-CM

## 2023-08-03 DIAGNOSIS — G89.29 CHRONIC LEFT SHOULDER PAIN: ICD-10-CM

## 2023-08-03 DIAGNOSIS — M25.512 CHRONIC LEFT SHOULDER PAIN: ICD-10-CM

## 2023-08-03 DIAGNOSIS — Z00.00 ROUTINE GENERAL MEDICAL EXAMINATION AT A HEALTH CARE FACILITY: Primary | ICD-10-CM

## 2023-08-03 DIAGNOSIS — Z13.220 SCREENING FOR LIPID DISORDERS: ICD-10-CM

## 2023-08-03 LAB
ALT SERPL W P-5'-P-CCNC: 48 U/L (ref 0–70)
ANION GAP SERPL CALCULATED.3IONS-SCNC: 11 MMOL/L (ref 7–15)
APO A-I SERPL-MCNC: 63 MG/DL
AST SERPL W P-5'-P-CCNC: 36 U/L (ref 0–45)
BUN SERPL-MCNC: 13.8 MG/DL (ref 6–20)
CALCIUM SERPL-MCNC: 9.2 MG/DL (ref 8.6–10)
CHLORIDE SERPL-SCNC: 105 MMOL/L (ref 98–107)
CHOLEST SERPL-MCNC: 212 MG/DL
CREAT SERPL-MCNC: 0.96 MG/DL (ref 0.67–1.17)
DEPRECATED HCO3 PLAS-SCNC: 23 MMOL/L (ref 22–29)
GFR SERPL CREATININE-BSD FRML MDRD: >90 ML/MIN/1.73M2
GLUCOSE SERPL-MCNC: 91 MG/DL (ref 70–99)
HDLC SERPL-MCNC: 78 MG/DL
LDLC SERPL CALC-MCNC: 119 MG/DL
NONHDLC SERPL-MCNC: 134 MG/DL
POTASSIUM SERPL-SCNC: 4.4 MMOL/L (ref 3.4–5.3)
SODIUM SERPL-SCNC: 139 MMOL/L (ref 136–145)
TRIGL SERPL-MCNC: 76 MG/DL
TSH SERPL DL<=0.005 MIU/L-ACNC: 0.72 UIU/ML (ref 0.3–4.2)

## 2023-08-03 PROCEDURE — 36415 COLL VENOUS BLD VENIPUNCTURE: CPT | Performed by: FAMILY MEDICINE

## 2023-08-03 PROCEDURE — 84460 ALANINE AMINO (ALT) (SGPT): CPT | Performed by: FAMILY MEDICINE

## 2023-08-03 PROCEDURE — 80048 BASIC METABOLIC PNL TOTAL CA: CPT | Performed by: FAMILY MEDICINE

## 2023-08-03 PROCEDURE — 80061 LIPID PANEL: CPT | Performed by: FAMILY MEDICINE

## 2023-08-03 PROCEDURE — 99396 PREV VISIT EST AGE 40-64: CPT | Performed by: FAMILY MEDICINE

## 2023-08-03 PROCEDURE — 83695 ASSAY OF LIPOPROTEIN(A): CPT | Performed by: FAMILY MEDICINE

## 2023-08-03 PROCEDURE — 84443 ASSAY THYROID STIM HORMONE: CPT | Performed by: FAMILY MEDICINE

## 2023-08-03 PROCEDURE — 84450 TRANSFERASE (AST) (SGOT): CPT | Performed by: FAMILY MEDICINE

## 2023-08-03 RX ORDER — BUDESONIDE 28 MG/1
AEROSOL, FOAM RECTAL
COMMUNITY
Start: 2023-08-03

## 2023-08-03 RX ORDER — FLUTICASONE PROPIONATE 50 MCG
1 SPRAY, SUSPENSION (ML) NASAL DAILY
Qty: 16 G | Refills: 5 | Status: SHIPPED | OUTPATIENT
Start: 2023-08-03

## 2023-08-03 RX ORDER — CLOBETASOL PROPIONATE 0.5 MG/G
OINTMENT TOPICAL 2 TIMES DAILY
Qty: 45 G | Refills: 1 | Status: SHIPPED | OUTPATIENT
Start: 2023-08-03 | End: 2024-05-05

## 2023-08-03 RX ORDER — KETOCONAZOLE 20 MG/ML
SHAMPOO TOPICAL
COMMUNITY
Start: 2023-05-08

## 2023-08-03 ASSESSMENT — ENCOUNTER SYMPTOMS
HEARTBURN: 0
CHILLS: 0
DYSURIA: 0
WEAKNESS: 0
ABDOMINAL PAIN: 0
HEMATOCHEZIA: 0
NERVOUS/ANXIOUS: 0
CONSTIPATION: 0
MYALGIAS: 0
PALPITATIONS: 0
FEVER: 0
ARTHRALGIAS: 1
EYE PAIN: 0
HEADACHES: 0
JOINT SWELLING: 1
SORE THROAT: 0
FREQUENCY: 0
PARESTHESIAS: 0
SHORTNESS OF BREATH: 0
HEMATURIA: 0
DIARRHEA: 0
NAUSEA: 0
DIZZINESS: 0
COUGH: 0

## 2023-08-03 NOTE — ASSESSMENT & PLAN NOTE
The patient snores and sometimes struggles to breathe through his nose.  He has had a sleep study that did not show obstructive sleep apnea.  This has been bothersome.  Referral placed for ENT for evaluation, laryngoscopy and recommendations.  In the meantime, trial of Flonase recommended.

## 2023-08-03 NOTE — ASSESSMENT & PLAN NOTE
Ongoing shoulder pain.  Otherwise, health is stable.  He exercises in a vigorous way multiple days per week.  Weight stable.  Feels well.  Rare flakiness of skin and ears and near nose which responds to topical steroid.  Ulcerative colitis is managed by [facility].  His father passed away in the past year (history of heart disease and dementia).  CT calcium score was 0 last year.  He has not had an LP(a) measurement.  Overall lipid profile is favorable.  No change to plan.  Referrals for orthopedics placed for evaluation and management.  Check thyroid level.

## 2023-08-03 NOTE — PROGRESS NOTES
SUBJECTIVE:   CC: Fred is an 48 year old who presents for preventative health visit.       8/3/2023     7:00 AM   Additional Questions   Roomed by almaz     Active.  Playing pickle ball travel.  Uses gym 4-5x, golfs.  Chases 8 year old son.     Nutrition: low carb framework.  Protein goal: 120 - 150.     Healthy Habits:     Getting at least 3 servings of Calcium per day:  Yes    Bi-annual eye exam:  Yes    Dental care twice a year:  Yes    Sleep apnea or symptoms of sleep apnea:  Excessive snoring    Diet:  Gluten-free/reduced    Frequency of exercise:  6-7 days/week    Duration of exercise:  45-60 minutes    Taking medications regularly:  Yes    Medication side effects:  None    Additional concerns today:  Yes      Today's PHQ-2 Score:       8/3/2023     7:02 AM   PHQ-2 (  [employer])   Q1: Little interest or pleasure in doing things 0   Q2: Feeling down, depressed or hopeless 0   PHQ-2 Score 0     Social History     Tobacco Use    Smoking status: Former     Types: Cigarettes     Quit date: 2015     Years since quittin.6    Smokeless tobacco: Never   Substance Use Topics    Alcohol use: Yes     Alcohol/week: 2.0 - 6.0 standard drinks of alcohol     Comment: Alcoholic Drinks/day: beer and liquor             8/3/2023     6:58 AM   Alcohol Use   Prescreen: >3 drinks/day or >7 drinks/week? No       Last PSA: No results found for: PSA    Reviewed orders with patient. Reviewed health maintenance and updated orders accordingly - Yes    Reviewed and updated as needed this visit by clinical staff   Tobacco  Allergies  Meds  Problems  Med Hx  Surg Hx  Fam Hx          Reviewed and updated as needed this visit by Provider   Tobacco  Allergies  Meds  Problems  Med Hx  Surg Hx  Fam Hx           Review of Systems   Constitutional:  Negative for chills and fever.   HENT:  Negative for congestion, ear pain, hearing loss and sore throat.    Eyes:  Negative for pain and visual disturbance.   Respiratory:   "Negative for cough and shortness of breath.    Cardiovascular:  Negative for chest pain, palpitations and peripheral edema.   Gastrointestinal:  Negative for abdominal pain, constipation, diarrhea, heartburn, hematochezia and nausea.   Genitourinary:  Negative for dysuria, frequency, genital sores, hematuria, impotence, penile discharge and urgency.   Musculoskeletal:  Positive for arthralgias and joint swelling. Negative for myalgias.   Skin:  Negative for rash.   Neurological:  Negative for dizziness, weakness, headaches and paresthesias.   Psychiatric/Behavioral:  Negative for mood changes. The patient is not nervous/anxious.      The 10-year ASCVD risk score (Bibiana ALLEN, et al., 2019) is: 2.2%    Values used to calculate the score:      Age: 48 years      Sex: Male      Is Non- : No      Diabetic: No      Tobacco smoker: No      Systolic Blood Pressure: 110 mmHg      Is BP treated: No      HDL Cholesterol: 71 mg/dL      Total Cholesterol: 259 mg/dL    OBJECTIVE:   /73 (BP Location: Left arm, Patient Position: Sitting, Cuff Size: Adult Regular)   Pulse 55   Temp 97.9  F (36.6  C) (Oral)   Resp 16   Ht 1.791 m (5' 10.5\")   Wt 92.9 kg (204 lb 11.2 oz)   SpO2 97%   BMI 28.96 kg/m      Physical Exam  Vitals and nursing note reviewed.   Constitutional:       General: He is not in acute distress.     Appearance: Normal appearance. He is not ill-appearing.   HENT:      Head: Normocephalic and atraumatic.      Right Ear: External ear normal.      Left Ear: External ear normal.      Nose: Nose normal.      Mouth/Throat:      Pharynx: Oropharynx is clear. No oropharyngeal exudate or posterior oropharyngeal erythema.   Eyes:      General: No scleral icterus.        Right eye: No discharge.         Left eye: No discharge.      Extraocular Movements: Extraocular movements intact.      Conjunctiva/sclera: Conjunctivae normal.      Pupils: Pupils are equal, round, and reactive to light. "   Neck:      Comments: No thyromegaly.  Cardiovascular:      Rate and Rhythm: Normal rate and regular rhythm.      Heart sounds: Normal heart sounds. No murmur heard.     No friction rub. No gallop.   Pulmonary:      Effort: Pulmonary effort is normal. No respiratory distress.      Breath sounds: Normal breath sounds. No wheezing or rales.   Abdominal:      General: There is no distension.      Palpations: Abdomen is soft. There is no mass.      Tenderness: There is no abdominal tenderness.   Musculoskeletal:         General: No signs of injury. Normal range of motion.      Cervical back: Normal range of motion.      Right lower leg: No edema.      Left lower leg: No edema.   Lymphadenopathy:      Cervical: No cervical adenopathy.   Skin:     General: Skin is warm.      Coloration: Skin is not jaundiced.      Findings: No rash.   Neurological:      General: No focal deficit present.      Mental Status: He is alert and oriented to person, place, and time.      Cranial Nerves: No cranial nerve deficit.      Deep Tendon Reflexes: Reflexes normal.   Psychiatric:         Attention and Perception: Attention normal.         Mood and Affect: Mood normal.         Speech: Speech normal.         Thought Content: Thought content normal.       ASSESSMENT/PLAN:     Problem List Items Addressed This Visit       Chronic left shoulder pain    Relevant Orders    Orthopedic  Referral    Chronic ulcerative colitis without complication (H)     Stable. [facility]         Relevant Medications    fluticasone (FLONASE) 50 MCG/ACT nasal spray    Congestion of paranasal sinus     The patient snores and sometimes struggles to breathe through his nose.  He has had a sleep study that did not show obstructive sleep apnea.  This has been bothersome.  Referral placed for ENT for evaluation, laryngoscopy and recommendations.  In the meantime, trial of Flonase recommended.         Relevant Medications    fluticasone (FLONASE) 50 MCG/ACT nasal  spray    Other Relevant Orders    Adult ENT  Referral    Hypercholesteremia     CT calcium score x2 has been 0.  Overall 10-year cardiovascular risk or remains low.  Strong family history of heart disease at young age.  Check LP(a) as well as typical lipid profile.  Anticipate stability of numbers and plan.         Relevant Orders    Basic metabolic panel  (Ca, Cl, CO2, Creat, Gluc, K, Na, BUN)    ALT    AST    Hypothyroidism     No symptoms of hypothyroidism.  Check TSH as its been 1 year.         Primary Vitiligo    Relevant Medications    ketoconazole (NIZORAL) 2 % external shampoo    Budesonide (UCERIS) 2 MG/ACT FOAM    clobetasol (TEMOVATE) 0.05 % external ointment    Routine general medical examination at a health care facility - Primary     Ongoing shoulder pain.  Otherwise, health is stable.  He exercises in a vigorous way multiple days per week.  Weight stable.  Feels well.  Rare flakiness of skin and ears and near nose which responds to topical steroid.  Ulcerative colitis is managed by [facility].  His father passed away in the past year (history of heart disease and dementia).  CT calcium score was 0 last year.  He has not had an LP(a) measurement.  Overall lipid profile is favorable.  No change to plan.  Referrals for orthopedics placed for evaluation and management.  Check thyroid level.          Other Visit Diagnoses       Screening for lipid disorders        Relevant Orders    Lipid panel reflex to direct LDL Fasting    Lipoprotein (a)    Screening for diabetes mellitus        Relevant Orders    Basic metabolic panel  (Ca, Cl, CO2, Creat, Gluc, K, Na, BUN)            Patient has been advised of split billing requirements and indicates understanding: Yes    COUNSELING:   Reviewed preventive health counseling, as reflected in patient instructions       Regular exercise       Healthy diet/nutrition    BMI:   Estimated body mass index is 28.96 kg/m  as calculated from the following:    Height  "as of this encounter: 1.791 m (5' 10.5\").    Weight as of this encounter: 92.9 kg (204 lb 11.2 oz).   Weight management plan: Discussed healthy diet and exercise guidelines    He reports that he quit smoking about 7 years ago. He has never used smokeless tobacco.          Rad Garcia MD  [facility]  "

## 2023-08-03 NOTE — ASSESSMENT & PLAN NOTE
CT calcium score x2 has been 0.  Overall 10-year cardiovascular risk or remains low.  Strong family history of heart disease at young age.  Check LP(a) as well as typical lipid profile.  Anticipate stability of numbers and plan.

## 2023-08-04 ENCOUNTER — TRANSFERRED RECORDS (OUTPATIENT)
Dept: HEALTH INFORMATION MANAGEMENT | Facility: CLINIC | Age: 48
End: 2023-08-04
Payer: COMMERCIAL

## 2023-08-08 ENCOUNTER — TRANSFERRED RECORDS (OUTPATIENT)
Dept: HEALTH INFORMATION MANAGEMENT | Facility: CLINIC | Age: 48
End: 2023-08-08
Payer: COMMERCIAL

## 2023-09-25 DIAGNOSIS — R09.81 CONGESTION OF PARANASAL SINUS: ICD-10-CM

## 2023-09-25 RX ORDER — FLUTICASONE PROPIONATE 50 MCG
1 SPRAY, SUSPENSION (ML) NASAL DAILY
Qty: 16 ML | Refills: 5 | OUTPATIENT
Start: 2023-09-25

## 2024-01-11 DIAGNOSIS — E03.9 ACQUIRED HYPOTHYROIDISM: ICD-10-CM

## 2024-01-11 RX ORDER — LEVOTHYROXINE SODIUM 200 UG/1
200 TABLET ORAL DAILY
Qty: 90 TABLET | Refills: 0 | Status: SHIPPED | OUTPATIENT
Start: 2024-01-11 | End: 2024-04-22

## 2024-04-21 DIAGNOSIS — E03.9 ACQUIRED HYPOTHYROIDISM: ICD-10-CM

## 2024-04-22 RX ORDER — LEVOTHYROXINE SODIUM 200 UG/1
200 TABLET ORAL DAILY
Qty: 90 TABLET | Refills: 1 | Status: SHIPPED | OUTPATIENT
Start: 2024-04-22

## 2024-05-03 ENCOUNTER — TRANSFERRED RECORDS (OUTPATIENT)
Dept: HEALTH INFORMATION MANAGEMENT | Facility: CLINIC | Age: 49
End: 2024-05-03
Payer: COMMERCIAL

## 2024-05-04 DIAGNOSIS — L80 VITILIGO: ICD-10-CM

## 2024-05-05 RX ORDER — CLOBETASOL PROPIONATE 0.5 MG/G
OINTMENT TOPICAL 2 TIMES DAILY
Qty: 45 G | Refills: 1 | Status: SHIPPED | OUTPATIENT
Start: 2024-05-05

## 2024-05-20 ENCOUNTER — TRANSFERRED RECORDS (OUTPATIENT)
Dept: HEALTH INFORMATION MANAGEMENT | Facility: CLINIC | Age: 49
End: 2024-05-20
Payer: COMMERCIAL

## 2024-05-30 ENCOUNTER — TRANSFERRED RECORDS (OUTPATIENT)
Dept: HEALTH INFORMATION MANAGEMENT | Facility: CLINIC | Age: 49
End: 2024-05-30
Payer: COMMERCIAL

## 2024-07-19 ENCOUNTER — TRANSFERRED RECORDS (OUTPATIENT)
Dept: HEALTH INFORMATION MANAGEMENT | Facility: CLINIC | Age: 49
End: 2024-07-19
Payer: COMMERCIAL

## 2024-07-19 LAB
ALT SERPL-CCNC: 41 IU/L (ref 0–44)
AST SERPL-CCNC: 30 IU/L (ref 0–40)
CREATININE (EXTERNAL): 1.01 MG/DL (ref 0.76–1.27)
GFR ESTIMATED (EXTERNAL): 91 ML/MIN/1.73
GLUCOSE (EXTERNAL): 80 MG/DL (ref 70–99)
POTASSIUM (EXTERNAL): 4.7 MMOL/L (ref 3.5–5.2)

## 2024-08-16 ENCOUNTER — OFFICE VISIT (OUTPATIENT)
Dept: FAMILY MEDICINE | Facility: CLINIC | Age: 49
End: 2024-08-16
Payer: COMMERCIAL

## 2024-08-16 VITALS
RESPIRATION RATE: 16 BRPM | WEIGHT: 207.3 LBS | DIASTOLIC BLOOD PRESSURE: 75 MMHG | HEIGHT: 71 IN | HEART RATE: 65 BPM | OXYGEN SATURATION: 98 % | TEMPERATURE: 97.9 F | BODY MASS INDEX: 29.02 KG/M2 | SYSTOLIC BLOOD PRESSURE: 113 MMHG

## 2024-08-16 DIAGNOSIS — H92.03 OTALGIA, BILATERAL: Primary | ICD-10-CM

## 2024-08-16 PROCEDURE — 99213 OFFICE O/P EST LOW 20 MIN: CPT

## 2024-08-16 NOTE — ASSESSMENT & PLAN NOTE
Patient presents with bilateral otalgia x48 hours with worsening last night. On exam, no evidence of acute otitis media. Tympanic membranes are well visualized bilaterally. Mild serous effusion with dull light reflex on left but does not appear infectious. No post or preauricular swelling or erythema. Both children with infectious illnesses at home, so discussed symptoms and physical exam are consistent with eustachian tube dysfunction. Would recommend trial of fluticasone, decongestants and/or anti-histamines. He can use OTC analgesics as they are helpful. Would recommend reevaluation with worsening of symptoms, persistence, or new concerning symptoms. Patient expressed understanding of and agreement with this plan. All questions were answered.

## 2024-08-16 NOTE — PATIENT INSTRUCTIONS
Fluticasone (Flonase) nasal spray. 1-2 sprays bilaterally for a week  Cetirizine (Zyrtec) daily for one-two weeks  Ibuprofen, acetaminophen, decongestants and warm or cold compresses

## 2024-08-16 NOTE — PROGRESS NOTES
Assessment & Plan   Problem List Items Addressed This Visit       Otalgia, bilateral - Primary     Patient presents with bilateral otalgia x48 hours with worsening last night. On exam, no evidence of acute otitis media. Tympanic membranes are well visualized bilaterally. Mild serous effusion with dull light reflex on left but does not appear infectious. No post or preauricular swelling or erythema. Both children with infectious illnesses at home, so discussed symptoms and physical exam are consistent with eustachian tube dysfunction. Would recommend trial of fluticasone, decongestants and/or anti-histamines. He can use OTC analgesics as they are helpful. Would recommend reevaluation with worsening of symptoms, persistence, or new concerning symptoms. Patient expressed understanding of and agreement with this plan. All questions were answered.           Mike Ibarra is a 49 year old, presenting for the following health issues:  Otalgia (Bilateral - 2 days)        8/16/2024     2:30 PM   Additional Questions   Roomed by ac   Accompanied by self     Bilateral ear pain x48 hours  No other infectious symptoms such as pharyngitis, rhinorrhea, fevers. No drainage from the ears but they do feel sore to the touch.   Home care has included Tylenol and cold compress. These do help with symptoms.   Daughter recently with mono. Son just getting over strep throat.   No history of recurrent ear infections. Hx of paranasal sinus congestion per chart.     History of Present Illness       Reason for visit:  Ear pain  Symptom onset:  1-3 days ago  Symptoms include:  Ear pain  Symptom intensity:  Severe  Symptom progression:  Staying the same  Had these symptoms before:  No  What makes it worse:  No  What makes it better:  Tylenol and cold compress    He eats 0-1 servings of fruits and vegetables daily.He consumes 0 sweetened beverage(s) daily.He exercises with enough effort to increase his heart rate 9 or less minutes per day.   "He exercises with enough effort to increase his heart rate 3 or less days per week.   He is taking medications regularly.       Objective    /75 (BP Location: Left arm, Patient Position: Sitting, Cuff Size: Adult Large)   Pulse 65   Temp 97.9  F (36.6  C) (Oral)   Resp 16   Ht 1.791 m (5' 10.5\")   Wt 94 kg (207 lb 4.8 oz)   SpO2 98%   BMI 29.32 kg/m    Body mass index is 29.32 kg/m .    Physical Exam  Vitals and nursing note reviewed.   Constitutional:       General: He is not in acute distress.     Appearance: Normal appearance.   HENT:      Right Ear: Tympanic membrane normal. Tenderness present. No drainage or swelling. No middle ear effusion. There is no impacted cerumen. Tympanic membrane is not injected, scarred, perforated, erythematous, retracted or bulging.      Left Ear: Tenderness present. No drainage or swelling. A middle ear effusion (mild, serous. dull light reflex.) is present. There is no impacted cerumen. Tympanic membrane is not injected, scarred, perforated, erythematous, retracted or bulging.      Ears:      Comments: Mild vascular erythema/congestion to bilateral canals  Cardiovascular:      Rate and Rhythm: Normal rate and regular rhythm.   Pulmonary:      Effort: Pulmonary effort is normal. No respiratory distress.   Neurological:      Mental Status: He is alert.   Psychiatric:         Mood and Affect: Mood normal.         Behavior: Behavior normal.         Thought Content: Thought content normal.            Signed Electronically by: QING Grier CNP    "

## 2024-09-28 ENCOUNTER — HEALTH MAINTENANCE LETTER (OUTPATIENT)
Age: 49
End: 2024-09-28

## 2025-02-05 DIAGNOSIS — E03.9 ACQUIRED HYPOTHYROIDISM: ICD-10-CM

## 2025-02-06 RX ORDER — LEVOTHYROXINE SODIUM 200 UG/1
TABLET ORAL
Qty: 90 TABLET | Refills: 0 | Status: SHIPPED | OUTPATIENT
Start: 2025-02-06

## 2025-04-10 ENCOUNTER — OFFICE VISIT (OUTPATIENT)
Dept: FAMILY MEDICINE | Facility: CLINIC | Age: 50
End: 2025-04-10
Payer: COMMERCIAL

## 2025-04-10 VITALS
HEART RATE: 63 BPM | SYSTOLIC BLOOD PRESSURE: 135 MMHG | DIASTOLIC BLOOD PRESSURE: 84 MMHG | HEIGHT: 71 IN | RESPIRATION RATE: 16 BRPM | TEMPERATURE: 97.6 F | BODY MASS INDEX: 29.83 KG/M2 | WEIGHT: 213.1 LBS | OXYGEN SATURATION: 99 %

## 2025-04-10 DIAGNOSIS — L80 VITILIGO: ICD-10-CM

## 2025-04-10 DIAGNOSIS — K51.90 CHRONIC ULCERATIVE COLITIS WITHOUT COMPLICATION, UNSPECIFIED LOCATION (H): ICD-10-CM

## 2025-04-10 DIAGNOSIS — Z13.1 SCREENING FOR DIABETES MELLITUS: ICD-10-CM

## 2025-04-10 DIAGNOSIS — E78.00 HYPERCHOLESTEREMIA: ICD-10-CM

## 2025-04-10 DIAGNOSIS — R21 RASH AND NONSPECIFIC SKIN ERUPTION: ICD-10-CM

## 2025-04-10 DIAGNOSIS — E03.9 HYPOTHYROIDISM, UNSPECIFIED TYPE: Primary | ICD-10-CM

## 2025-04-10 PROBLEM — Z71.1 CONCERN ABOUT EYE DISEASE WITHOUT DIAGNOSIS: Status: RESOLVED | Noted: 2022-06-14 | Resolved: 2025-04-10

## 2025-04-10 PROBLEM — G47.00 INSOMNIA, UNSPECIFIED TYPE: Status: RESOLVED | Noted: 2021-04-30 | Resolved: 2025-04-10

## 2025-04-10 LAB
ALT SERPL W P-5'-P-CCNC: 34 U/L (ref 0–70)
ANION GAP SERPL CALCULATED.3IONS-SCNC: 11 MMOL/L (ref 7–15)
BUN SERPL-MCNC: 15.7 MG/DL (ref 6–20)
CALCIUM SERPL-MCNC: 10.2 MG/DL (ref 8.8–10.4)
CHLORIDE SERPL-SCNC: 102 MMOL/L (ref 98–107)
CHOLEST SERPL-MCNC: 267 MG/DL
CREAT SERPL-MCNC: 0.92 MG/DL (ref 0.67–1.17)
EGFRCR SERPLBLD CKD-EPI 2021: >90 ML/MIN/1.73M2
FASTING STATUS PATIENT QL REPORTED: YES
FASTING STATUS PATIENT QL REPORTED: YES
GLUCOSE SERPL-MCNC: 103 MG/DL (ref 70–99)
HCO3 SERPL-SCNC: 26 MMOL/L (ref 22–29)
HDLC SERPL-MCNC: 63 MG/DL
LDLC SERPL CALC-MCNC: 189 MG/DL
NONHDLC SERPL-MCNC: 204 MG/DL
POTASSIUM SERPL-SCNC: 4.4 MMOL/L (ref 3.4–5.3)
SODIUM SERPL-SCNC: 139 MMOL/L (ref 135–145)
TRIGL SERPL-MCNC: 75 MG/DL
TSH SERPL DL<=0.005 MIU/L-ACNC: 0.8 UIU/ML (ref 0.3–4.2)

## 2025-04-10 RX ORDER — CLOBETASOL PROPIONATE 0.5 MG/G
OINTMENT TOPICAL 2 TIMES DAILY
Qty: 45 G | Refills: 1 | Status: SHIPPED | OUTPATIENT
Start: 2025-04-10

## 2025-04-10 RX ORDER — MESALAMINE 1000 MG/1
SUPPOSITORY RECTAL
COMMUNITY
Start: 2024-08-27

## 2025-04-10 RX ORDER — KETOCONAZOLE 20 MG/ML
SHAMPOO, SUSPENSION TOPICAL DAILY PRN
Qty: 120 ML | Refills: 5 | Status: SHIPPED | OUTPATIENT
Start: 2025-04-10

## 2025-04-10 RX ORDER — CLOBETASOL PROPIONATE 0.5 MG/G
OINTMENT TOPICAL 2 TIMES DAILY
Qty: 45 G | Refills: 1 | Status: SHIPPED | OUTPATIENT
Start: 2025-04-10 | End: 2025-04-10

## 2025-04-10 NOTE — ASSESSMENT & PLAN NOTE
Euthyroid based on discussion.  Check TSH.  Anticipate stability and need to continue 200 mcg/day.  TSH in process.

## 2025-04-10 NOTE — PROGRESS NOTES
"  Assessment & Plan   Problem List Items Addressed This Visit       Chronic ulcerative colitis without complication (H)     Doing well.  Continues to follow with [facility].         Relevant Medications    mesalamine (CANASA) 1000 MG suppository    Hypercholesteremia     We will plan to check labs and calculate ASCVD risk score.         Relevant Orders    Lipid panel reflex to direct LDL Fasting    Basic metabolic panel  (Ca, Cl, CO2, Creat, Gluc, K, Na, BUN)    ALT    Hypothyroidism - Primary     Euthyroid based on discussion.  Check TSH.  Anticipate stability and need to continue 200 mcg/day.  TSH in process.         Relevant Orders    TSH with free T4 reflex    Primary Vitiligo    Relevant Medications    ketoconazole (NIZORAL) 2 % external shampoo    clobetasol (TEMOVATE) 0.05 % external ointment     Other Visit Diagnoses       Screening for diabetes mellitus        Relevant Orders    Basic metabolic panel  (Ca, Cl, CO2, Creat, Gluc, K, Na, BUN)    Rash and nonspecific skin eruption        Relevant Medications    ketoconazole (NIZORAL) 2 % external shampoo    clobetasol (TEMOVATE) 0.05 % external ointment           The longitudinal plan of care for the diagnosis(es)/condition(s) as documented were addressed during this visit. Due to the added complexity in care, I will continue to support Fred in the subsequent management and with ongoing continuity of care.    Subjective   Fred is a 50 year old, presenting for the following health issues:  Recheck Medication and Refill Request        4/10/2025    10:03 AM   Additional Questions   Roomed by almaz     History of Present Illness       Reason for visit:  Annual checkup   He is taking medications regularly.          Objective    /84 (BP Location: Left arm, Patient Position: Sitting, Cuff Size: Adult Regular)   Pulse 63   Temp 97.6  F (36.4  C) (Oral)   Resp 16   Ht 1.791 m (5' 10.5\")   Wt 96.7 kg (213 lb 1.6 oz)   SpO2 99%   BMI 30.14 kg/m    Body mass " index is 30.14 kg/m .  Physical Exam  Nursing note reviewed.   Constitutional:       General: He is not in acute distress.     Appearance: Normal appearance. He is not ill-appearing.   HENT:      Head: Normocephalic and atraumatic.   Eyes:      Extraocular Movements: Extraocular movements intact.      Conjunctiva/sclera: Conjunctivae normal.   Pulmonary:      Effort: Pulmonary effort is normal.   Neurological:      Mental Status: He is alert and oriented to person, place, and time.   Psychiatric:         Attention and Perception: Attention normal.         Mood and Affect: Mood normal.         Speech: Speech normal.         Thought Content: Thought content normal.                    Signed Electronically by: Rad Garcia MD

## 2025-04-16 DIAGNOSIS — E03.9 ACQUIRED HYPOTHYROIDISM: ICD-10-CM

## 2025-04-16 RX ORDER — LEVOTHYROXINE SODIUM 200 UG/1
TABLET ORAL
Qty: 90 TABLET | Refills: 2 | Status: SHIPPED | OUTPATIENT
Start: 2025-04-16

## 2025-05-27 ENCOUNTER — MYC REFILL (OUTPATIENT)
Dept: FAMILY MEDICINE | Facility: CLINIC | Age: 50
End: 2025-05-27
Payer: COMMERCIAL

## 2025-05-27 RX ORDER — BUDESONIDE 28 MG/1
AEROSOL, FOAM RECTAL
Status: CANCELLED | OUTPATIENT
Start: 2025-05-27

## 2025-07-08 ENCOUNTER — MYC REFILL (OUTPATIENT)
Dept: FAMILY MEDICINE | Facility: CLINIC | Age: 50
End: 2025-07-08
Payer: COMMERCIAL

## 2025-07-08 DIAGNOSIS — E03.9 ACQUIRED HYPOTHYROIDISM: ICD-10-CM

## 2025-07-09 RX ORDER — LEVOTHYROXINE SODIUM 200 UG/1
TABLET ORAL
Qty: 90 TABLET | Refills: 2 | OUTPATIENT
Start: 2025-07-09

## 2025-07-14 DIAGNOSIS — E03.9 ACQUIRED HYPOTHYROIDISM: Primary | ICD-10-CM

## 2025-07-14 RX ORDER — LEVOTHYROXINE SODIUM 200 UG/1
200 TABLET ORAL
Qty: 30 TABLET | Refills: 0 | Status: SHIPPED | OUTPATIENT
Start: 2025-07-14 | End: 2025-08-13

## 2025-07-14 NOTE — TELEPHONE ENCOUNTER
Medication Question or Refill    Contacts       Contact Date/Time Type Contact Phone/Fax    07/14/2025 09:15 AM CDT Phone (Incoming) Ricky Terryn M (Self) [phone] (H)            What medication are you calling about (include dose and sig)?:   levothyroxine (SYNTHROID/LEVOTHROID) 200 MCG tablet        Sig: Take 1 tablet by mouth daily 30 minutes prior to breakfast.       Preferred Pharmacy:   [facility] [id number] [place] [facility] [place] - [address]   [address] [place] [address]  Phone: [phone] Fax: [phone]    Who prescribed the medication?: Rad Garcia MD     Do you need a refill? Yes- patient typically uses [facility] home delivery pharmacy. He was supposed to have received a refill recently but it did not come (he is following up with [employer]). Needing a limited supply sent to local pharmacy, requesting 30 day supply.     Patient offered an appointment? No    Could we send this information to you in [facility] or would you prefer to receive a phone call?:   No preference   Okay to leave a detailed message?: No at Home number on file [phone] (home)